# Patient Record
Sex: MALE | Race: ASIAN | NOT HISPANIC OR LATINO | Employment: UNEMPLOYED | URBAN - NONMETROPOLITAN AREA
[De-identification: names, ages, dates, MRNs, and addresses within clinical notes are randomized per-mention and may not be internally consistent; named-entity substitution may affect disease eponyms.]

---

## 2020-02-13 ENCOUNTER — APPOINTMENT (EMERGENCY)
Dept: RADIOLOGY | Facility: HOSPITAL | Age: 55
End: 2020-02-13
Payer: COMMERCIAL

## 2020-02-13 ENCOUNTER — HOSPITAL ENCOUNTER (EMERGENCY)
Facility: HOSPITAL | Age: 55
Discharge: HOME | End: 2020-02-13
Attending: EMERGENCY MEDICINE | Admitting: EMERGENCY MEDICINE
Payer: COMMERCIAL

## 2020-02-13 VITALS
WEIGHT: 315 LBS | RESPIRATION RATE: 17 BRPM | TEMPERATURE: 97.7 F | BODY MASS INDEX: 37.19 KG/M2 | HEIGHT: 77 IN | DIASTOLIC BLOOD PRESSURE: 85 MMHG | HEART RATE: 74 BPM | SYSTOLIC BLOOD PRESSURE: 153 MMHG | OXYGEN SATURATION: 99 %

## 2020-02-13 DIAGNOSIS — R07.9 CHEST PAIN: Primary | ICD-10-CM

## 2020-02-13 LAB
ALBUMIN SERPL BCP-MCNC: 3.8 G/DL (ref 3.5–5)
ALP SERPL-CCNC: 80 U/L (ref 46–116)
ALT SERPL W P-5'-P-CCNC: 21 U/L (ref 12–78)
ANION GAP SERPL CALCULATED.3IONS-SCNC: 9 MMOL/L (ref 4–13)
AST SERPL W P-5'-P-CCNC: 15 U/L (ref 5–45)
ATRIAL RATE: 79 BPM
BASOPHILS # BLD AUTO: 0.04 THOUSANDS/ΜL (ref 0–0.1)
BASOPHILS NFR BLD AUTO: 0 % (ref 0–1)
BILIRUB SERPL-MCNC: 0.33 MG/DL (ref 0.2–1)
BUN SERPL-MCNC: 13 MG/DL (ref 5–25)
CALCIUM SERPL-MCNC: 8.8 MG/DL (ref 8.3–10.1)
CHLORIDE SERPL-SCNC: 105 MMOL/L (ref 100–108)
CO2 SERPL-SCNC: 27 MMOL/L (ref 21–32)
CREAT SERPL-MCNC: 1.06 MG/DL (ref 0.6–1.3)
EOSINOPHIL # BLD AUTO: 0.11 THOUSAND/ΜL (ref 0–0.61)
EOSINOPHIL NFR BLD AUTO: 1 % (ref 0–6)
ERYTHROCYTE [DISTWIDTH] IN BLOOD BY AUTOMATED COUNT: 12.7 % (ref 11.6–15.1)
FLUAV RNA NPH QL NAA+PROBE: NORMAL
FLUBV RNA NPH QL NAA+PROBE: NORMAL
GFR SERPL CREATININE-BSD FRML MDRD: 92 ML/MIN/1.73SQ M
GLUCOSE SERPL-MCNC: 95 MG/DL (ref 65–140)
HCT VFR BLD AUTO: 44.6 % (ref 36.5–49.3)
HGB BLD-MCNC: 14.7 G/DL (ref 12–17)
IMM GRANULOCYTES # BLD AUTO: 0.03 THOUSAND/UL (ref 0–0.2)
IMM GRANULOCYTES NFR BLD AUTO: 0 % (ref 0–2)
LYMPHOCYTES # BLD AUTO: 2.82 THOUSANDS/ΜL (ref 0.6–4.47)
LYMPHOCYTES NFR BLD AUTO: 31 % (ref 14–44)
MCH RBC QN AUTO: 29.2 PG (ref 26.8–34.3)
MCHC RBC AUTO-ENTMCNC: 33 G/DL (ref 31.4–37.4)
MCV RBC AUTO: 89 FL (ref 82–98)
MONOCYTES # BLD AUTO: 0.7 THOUSAND/ΜL (ref 0.17–1.22)
MONOCYTES NFR BLD AUTO: 8 % (ref 4–12)
NEUTROPHILS # BLD AUTO: 5.27 THOUSANDS/ΜL (ref 1.85–7.62)
NEUTS SEG NFR BLD AUTO: 60 % (ref 43–75)
NRBC BLD AUTO-RTO: 0 /100 WBCS
NT-PROBNP SERPL-MCNC: 24 PG/ML
P AXIS: 47 DEGREES
PLATELET # BLD AUTO: 262 THOUSANDS/UL (ref 149–390)
PMV BLD AUTO: 10.4 FL (ref 8.9–12.7)
POTASSIUM SERPL-SCNC: 3.9 MMOL/L (ref 3.5–5.3)
PR INTERVAL: 150 MS
PROT SERPL-MCNC: 7.8 G/DL (ref 6.4–8.2)
QRS AXIS: 80 DEGREES
QRSD INTERVAL: 84 MS
QT INTERVAL: 376 MS
QTC INTERVAL: 431 MS
RBC # BLD AUTO: 5.04 MILLION/UL (ref 3.88–5.62)
RSV RNA NPH QL NAA+PROBE: NORMAL
SODIUM SERPL-SCNC: 141 MMOL/L (ref 136–145)
T WAVE AXIS: 53 DEGREES
TROPONIN I SERPL-MCNC: <0.02 NG/ML
TROPONIN I SERPL-MCNC: <0.02 NG/ML
VENTRICULAR RATE: 79 BPM
WBC # BLD AUTO: 8.97 THOUSAND/UL (ref 4.31–10.16)

## 2020-02-13 PROCEDURE — 36415 COLL VENOUS BLD VENIPUNCTURE: CPT

## 2020-02-13 PROCEDURE — 85025 COMPLETE CBC W/AUTO DIFF WBC: CPT

## 2020-02-13 PROCEDURE — 96361 HYDRATE IV INFUSION ADD-ON: CPT

## 2020-02-13 PROCEDURE — 80053 COMPREHEN METABOLIC PANEL: CPT

## 2020-02-13 PROCEDURE — 84484 ASSAY OF TROPONIN QUANT: CPT

## 2020-02-13 PROCEDURE — 83880 ASSAY OF NATRIURETIC PEPTIDE: CPT | Performed by: EMERGENCY MEDICINE

## 2020-02-13 PROCEDURE — 96374 THER/PROPH/DIAG INJ IV PUSH: CPT

## 2020-02-13 PROCEDURE — 99284 EMERGENCY DEPT VISIT MOD MDM: CPT | Performed by: EMERGENCY MEDICINE

## 2020-02-13 PROCEDURE — 87631 RESP VIRUS 3-5 TARGETS: CPT | Performed by: EMERGENCY MEDICINE

## 2020-02-13 PROCEDURE — 71046 X-RAY EXAM CHEST 2 VIEWS: CPT

## 2020-02-13 PROCEDURE — 84484 ASSAY OF TROPONIN QUANT: CPT | Performed by: EMERGENCY MEDICINE

## 2020-02-13 PROCEDURE — 93005 ELECTROCARDIOGRAM TRACING: CPT

## 2020-02-13 PROCEDURE — 96375 TX/PRO/DX INJ NEW DRUG ADDON: CPT

## 2020-02-13 PROCEDURE — 99285 EMERGENCY DEPT VISIT HI MDM: CPT

## 2020-02-13 RX ORDER — GLIMEPIRIDE 2 MG/1
2 TABLET ORAL DAILY
COMMUNITY
Start: 2019-03-14 | End: 2021-06-20

## 2020-02-13 RX ORDER — HYDROCHLOROTHIAZIDE 25 MG/1
25 TABLET ORAL DAILY
COMMUNITY
Start: 2019-10-03 | End: 2021-06-20

## 2020-02-13 RX ORDER — DEXAMETHASONE SODIUM PHOSPHATE 10 MG/ML
10 INJECTION, SOLUTION INTRAMUSCULAR; INTRAVENOUS ONCE
Status: COMPLETED | OUTPATIENT
Start: 2020-02-13 | End: 2020-02-13

## 2020-02-13 RX ORDER — KETOROLAC TROMETHAMINE 30 MG/ML
30 INJECTION, SOLUTION INTRAMUSCULAR; INTRAVENOUS ONCE
Status: COMPLETED | OUTPATIENT
Start: 2020-02-13 | End: 2020-02-13

## 2020-02-13 RX ORDER — ACETAMINOPHEN 325 MG/1
650 TABLET ORAL ONCE
Status: COMPLETED | OUTPATIENT
Start: 2020-02-13 | End: 2020-02-13

## 2020-02-13 RX ORDER — ASPIRIN 325 MG
325 TABLET ORAL ONCE
Status: COMPLETED | OUTPATIENT
Start: 2020-02-13 | End: 2020-02-13

## 2020-02-13 RX ORDER — IRBESARTAN 300 MG/1
300 TABLET ORAL DAILY
COMMUNITY
Start: 2019-09-05 | End: 2021-06-20

## 2020-02-13 RX ORDER — ONDANSETRON 2 MG/ML
4 INJECTION INTRAMUSCULAR; INTRAVENOUS ONCE
Status: COMPLETED | OUTPATIENT
Start: 2020-02-13 | End: 2020-02-13

## 2020-02-13 RX ORDER — SODIUM CHLORIDE 9 MG/ML
3 INJECTION INTRAVENOUS AS NEEDED
Status: DISCONTINUED | OUTPATIENT
Start: 2020-02-13 | End: 2020-02-13 | Stop reason: HOSPADM

## 2020-02-13 RX ADMIN — KETOROLAC TROMETHAMINE 30 MG: 30 INJECTION, SOLUTION INTRAMUSCULAR at 00:35

## 2020-02-13 RX ADMIN — FAMOTIDINE 20 MG: 10 INJECTION, SOLUTION INTRAVENOUS at 00:33

## 2020-02-13 RX ADMIN — ONDANSETRON 4 MG: 2 INJECTION INTRAMUSCULAR; INTRAVENOUS at 00:33

## 2020-02-13 RX ADMIN — SODIUM CHLORIDE 1000 ML: 0.9 INJECTION, SOLUTION INTRAVENOUS at 00:26

## 2020-02-13 RX ADMIN — DEXAMETHASONE SODIUM PHOSPHATE 10 MG: 10 INJECTION, SOLUTION INTRAMUSCULAR; INTRAVENOUS at 00:36

## 2020-02-13 RX ADMIN — ASPIRIN 325 MG ORAL TABLET 325 MG: 325 PILL ORAL at 01:51

## 2020-02-13 RX ADMIN — ACETAMINOPHEN 650 MG: 325 TABLET ORAL at 00:36

## 2020-02-13 RX ADMIN — NITROGLYCERIN 1 INCH: 20 OINTMENT TOPICAL at 01:52

## 2020-02-13 NOTE — DISCHARGE INSTRUCTIONS
Make sure to follow up with your family doctor and Cardiology tomorrow  If symptoms persist or worsen, return to ER!

## 2020-02-14 NOTE — ED PROVIDER NOTES
History  Chief Complaint   Patient presents with    Chest Pain     Patient started with abdominal pain, chest pain, headache, sore throat and nausea at 11am         Chest Pain   Pain location:  Substernal area  Pain quality: aching and dull    Pain radiates to:  Does not radiate  Pain severity:  Moderate  Onset quality:  Gradual  Duration:  4 hours  Timing:  Constant  Progression:  Worsening  Context: not breathing    Relieved by:  None tried  Worsened by:  Nothing tried  Ineffective treatments:  None tried  Associated symptoms: cough    Associated symptoms: no abdominal pain, no back pain, no lower extremity edema, no nausea, no shortness of breath and not vomiting        Prior to Admission Medications   Prescriptions Last Dose Informant Patient Reported? Taking?   glimepiride (AMARYL) 2 mg tablet   Yes Yes   Sig: Take 2 mg by mouth daily   hydrochlorothiazide (HYDRODIURIL) 25 mg tablet   Yes Yes   Sig: Take 25 mg by mouth daily   irbesartan (AVAPRO) 300 mg tablet   Yes Yes   Sig: Take 300 mg by mouth daily      Facility-Administered Medications: None       Past Medical History:   Diagnosis Date    Stroke [facility])        History reviewed  No pertinent surgical history  History reviewed  No pertinent family history  I have reviewed and agree with the history as documented  Social History     Tobacco Use    Smoking status: Current Every Day Smoker     Packs/day: 0 25     Types: Cigarettes    Smokeless tobacco: Never Used   Substance Use Topics    Alcohol use: Not Currently    Drug use: Never       Review of Systems   HENT: Positive for sore throat  Respiratory: Positive for cough  Negative for shortness of breath  Cardiovascular: Positive for chest pain  Gastrointestinal: Negative for abdominal pain, nausea and vomiting  Musculoskeletal: Negative for back pain  All other systems reviewed and are negative        Physical Exam  Physical Exam   Constitutional: He is oriented to person, place, and time  He appears well-developed and well-nourished  HENT:   Head: Normocephalic and atraumatic  Eyes: Pupils are equal, round, and reactive to light  EOM are normal    Neck: Normal range of motion  Neck supple  Cardiovascular: Normal rate, regular rhythm, intact distal pulses and normal pulses  Pulmonary/Chest: Effort normal and breath sounds normal  No accessory muscle usage  No respiratory distress  Abdominal: Soft  Bowel sounds are normal    Musculoskeletal: Normal range of motion  Right lower leg: Normal         Left lower leg: Normal    Neurological: He is alert and oriented to person, place, and time  Skin: Skin is warm and dry  Capillary refill takes less than 2 seconds  Psychiatric: He has a normal mood and affect  Nursing note and vitals reviewed        Vital Signs  ED Triage Vitals   Temperature Pulse Respirations Blood Pressure SpO2   02/13/20 0007 02/13/20 0030 02/13/20 0007 02/13/20 0010 02/13/20 0030   97 7 °F (36 5 °C) 75 18 168/99 97 %      Temp Source Heart Rate Source Patient Position - Orthostatic VS BP Location FiO2 (%)   02/13/20 0007 02/13/20 0007 02/13/20 0007 02/13/20 0007 --   Temporal Monitor Lying Left arm       Pain Score       02/13/20 0007       Worst Possible Pain           Vitals:    02/13/20 0030 02/13/20 0100 02/13/20 0200 02/13/20 0330   BP: 161/94 156/82 146/92 153/85   Pulse: 75 84 69 74   Patient Position - Orthostatic VS: Lying Lying Lying Lying         Visual Acuity      ED Medications  Medications   sodium chloride 0 9 % bolus 1,000 mL (0 mL Intravenous Stopped 2/13/20 0230)   dexamethasone (PF) (DECADRON) injection 10 mg (10 mg Intravenous Given 2/13/20 0036)   ketorolac (TORADOL) injection 30 mg (30 mg Intravenous Given 2/13/20 0035)   ondansetron (ZOFRAN) injection 4 mg (4 mg Intravenous Given 2/13/20 0033)   famotidine (PEPCID) injection 20 mg (20 mg Intravenous Given 2/13/20 0033)   acetaminophen (TYLENOL) tablet 650 mg (650 mg Oral Given 2/13/20 0036)   aspirin tablet 325 mg (325 mg Oral Given 2/13/20 0151)   nitroglycerin (NITRO-BID) 2 % TD ointment 1 inch (1 inch Topical Given 2/13/20 0152)       Diagnostic Studies  Results Reviewed     Procedure Component Value Units Date/Time    Troponin I [[id number]]  (Normal) Collected:  02/13/20 0327    Lab Status:  Final result Specimen:  Blood from Arm, Left Updated:  02/13/20 0353     Troponin I <0 02 ng/mL     Influenza A/B and RSV PCR [[id number]]  (Normal) Collected:  02/13/20 0026    Lab Status:  Final result Specimen:  Nasopharyngeal Swab Updated:  02/13/20 0114     INFLUENZA A PCR None Detected     INFLUENZA B PCR None Detected     RSV PCR None Detected    Comprehensive metabolic panel [[id number]] Collected:  02/13/20 0026    Lab Status:  Final result Specimen:  Blood from Arm, Right Updated:  02/13/20 0057     Sodium 141 mmol/L      Potassium 3 9 mmol/L      Chloride 105 mmol/L      CO2 27 mmol/L      ANION GAP 9 mmol/L      BUN 13 mg/dL      Creatinine 1 06 mg/dL      Glucose 95 mg/dL      Calcium 8 8 mg/dL      AST 15 U/L      ALT 21 U/L      Alkaline Phosphatase 80 U/L      Total Protein 7 8 g/dL      Albumin 3 8 g/dL      Total Bilirubin 0 33 mg/dL      eGFR 92 ml/min/1 73sq m     Narrative:       Regine guidelines for Chronic Kidney Disease (CKD):     Stage 1 with normal or high GFR (GFR > 90 mL/min/1 73 square meters)    Stage 2 Mild CKD (GFR = 60-89 mL/min/1 73 square meters)    Stage 3A Moderate CKD (GFR = 45-59 mL/min/1 73 square meters)    Stage 3B Moderate CKD (GFR = 30-44 mL/min/1 73 square meters)    Stage 4 Severe CKD (GFR = 15-29 mL/min/1 73 square meters)    Stage 5 End Stage CKD (GFR <15 mL/min/1 73 square meters)  Note: GFR calculation is accurate only with a steady state creatinine    NT-BNP PRO [[id number]]  (Normal) Collected:  02/13/20 0026    Lab Status:  Final result Specimen:  Blood from Arm, Right Updated:  02/13/20 0057     NT-proBNP 24 pg/mL Troponin I [[id number]]  (Normal) Collected:  02/13/20 0026    Lab Status:  Final result Specimen:  Blood from Arm, Right Updated:  02/13/20 0052     Troponin I <0 02 ng/mL     CBC and differential [[id number]] Collected:  02/13/20 0026    Lab Status:  Final result Specimen:  Blood from Arm, Right Updated:  02/13/20 0035     WBC 8 97 Thousand/uL      RBC 5 04 Million/uL      Hemoglobin 14 7 g/dL      Hematocrit 44 6 %      MCV 89 fL      MCH 29 2 pg      MCHC 33 0 g/dL      RDW 12 7 %      MPV 10 4 fL      Platelets 580 Thousands/uL      nRBC 0 /100 WBCs      Neutrophils Relative 60 %      Immat GRANS % 0 %      Lymphocytes Relative 31 %      Monocytes Relative 8 %      Eosinophils Relative 1 %      Basophils Relative 0 %      Neutrophils Absolute 5 27 Thousands/µL      Immature Grans Absolute 0 03 Thousand/uL      Lymphocytes Absolute 2 82 Thousands/µL      Monocytes Absolute 0 70 Thousand/µL      Eosinophils Absolute 0 11 Thousand/µL      Basophils Absolute 0 04 Thousands/µL                  XR chest 2 views   Final Result by Dennis Phan MD (02/13 0941)      Mildly tortuous ascending aorta  This could be related to patient's hypertension and should be correlated clinically  The lungs are clear         Comparison to prior x-ray would be useful if it becomes available  Alternatively CT or echocardiography may be useful to assess the aortic size  The study was marked in EPIC for significant notification  Workstation performed: [id number]                    Procedures  Procedures         ED Course  ED Course as of Feb 14 0024   Thu Feb 13, 2020   0127 Initial troponin was negative, will repeat troponin  Troponin I: <0 02   0404 Repeat troponin negative  Patient is afebrile, vital signs stable, nontoxic appearing, counseled patient extensively signs and symptoms return and follow-up family doctor              HEART Risk Score      Most Recent Value   History  0 Filed at: 02/13/2020 0128   ECG 0 Filed at: 02/13/2020 0128   Age  1 Filed at: 02/13/2020 0128   Risk Factors  2 Filed at: 02/13/2020 0128   Troponin  0 Filed at: 02/13/2020 0128   Heart Score Risk Calculator   History  0 Filed at: 02/13/2020 0128   ECG  0 Filed at: 02/13/2020 0128   Age  1 Filed at: 02/13/2020 0128   Risk Factors  2 Filed at: 02/13/2020 0128   Troponin  0 Filed at: 02/13/2020 0128   HEART Score  3 Filed at: 02/13/2020 0128   HEART Score  3 Filed at: 02/13/2020 0128                            MDM      Disposition  Final diagnoses:   Chest pain     Time reflects when diagnosis was documented in both MDM as applicable and the Disposition within this note     Time User Action Codes Description Comment    2/13/2020  4:07 AM Clinton Hollins Add [R07 9] Chest pain       ED Disposition     ED Disposition Condition Date/Time Comment    Discharge Stable Thu Feb 13, 2020  4:07 AM Elena Shea discharge to home/self care  Follow-up Information     Follow up With Specialties Details Why Contact Info Additional Information    [facility] Family Medicine Call   [place] [phone]  [address], [place], [place] Cardiology Call today  [phone]  [phone]             Discharge Medication List as of 2/13/2020  4:09 AM      CONTINUE these medications which have NOT CHANGED    Details   glimepiride (AMARYL) 2 mg tablet Take 2 mg by mouth daily, Starting Thu 3/14/2019, Until Fri 3/13/2020, Historical Med      hydrochlorothiazide (HYDRODIURIL) 25 mg tablet Take 25 mg by mouth daily, Starting Thu 10/3/2019, Until Fri 10/2/2020, Historical Med      irbesartan (AVAPRO) 300 mg tablet Take 300 mg by mouth daily, Starting Thu 9/5/2019, Until Fri 9/4/2020, Historical Med           No discharge procedures on file      PDMP Review     None          ED Provider  Electronically Signed by           Adal Dean DO  02/14/20 6950

## 2020-08-20 ENCOUNTER — TRANSCRIBE ORDERS (OUTPATIENT)
Dept: ADMINISTRATIVE | Facility: HOSPITAL | Age: 55
End: 2020-08-20

## 2020-08-20 DIAGNOSIS — Z01.810 PREOP CARDIOVASCULAR EXAM: Primary | ICD-10-CM

## 2020-11-23 ENCOUNTER — APPOINTMENT (EMERGENCY)
Dept: NON INVASIVE DIAGNOSTICS | Facility: HOSPITAL | Age: 55
End: 2020-11-23
Payer: COMMERCIAL

## 2020-11-23 ENCOUNTER — APPOINTMENT (EMERGENCY)
Dept: CT IMAGING | Facility: HOSPITAL | Age: 55
End: 2020-11-23
Payer: COMMERCIAL

## 2020-11-23 ENCOUNTER — HOSPITAL ENCOUNTER (EMERGENCY)
Facility: HOSPITAL | Age: 55
Discharge: HOME | End: 2020-11-23
Attending: EMERGENCY MEDICINE
Payer: COMMERCIAL

## 2020-11-23 ENCOUNTER — APPOINTMENT (EMERGENCY)
Dept: RADIOLOGY | Facility: HOSPITAL | Age: 55
End: 2020-11-23
Payer: COMMERCIAL

## 2020-11-23 VITALS
HEART RATE: 82 BPM | SYSTOLIC BLOOD PRESSURE: 155 MMHG | WEIGHT: 315 LBS | HEIGHT: 77 IN | TEMPERATURE: 98.2 F | DIASTOLIC BLOOD PRESSURE: 87 MMHG | RESPIRATION RATE: 16 BRPM | BODY MASS INDEX: 37.19 KG/M2 | OXYGEN SATURATION: 100 %

## 2020-11-23 DIAGNOSIS — M25.552 LEFT HIP PAIN: Primary | ICD-10-CM

## 2020-11-23 DIAGNOSIS — M79.605 LEFT LEG PAIN: ICD-10-CM

## 2020-11-23 LAB
ANION GAP SERPL CALCULATED.3IONS-SCNC: 7 MMOL/L (ref 4–13)
APTT PPP: 37 SECONDS (ref 23–37)
BASOPHILS # BLD AUTO: 0.02 THOUSANDS/ΜL (ref 0–0.1)
BASOPHILS NFR BLD AUTO: 0 % (ref 0–1)
BUN SERPL-MCNC: 13 MG/DL (ref 5–25)
CALCIUM SERPL-MCNC: 8.9 MG/DL (ref 8.3–10.1)
CHLORIDE SERPL-SCNC: 106 MMOL/L (ref 100–108)
CO2 SERPL-SCNC: 28 MMOL/L (ref 21–32)
CREAT SERPL-MCNC: 0.99 MG/DL (ref 0.6–1.3)
EOSINOPHIL # BLD AUTO: 0.12 THOUSAND/ΜL (ref 0–0.61)
EOSINOPHIL NFR BLD AUTO: 1 % (ref 0–6)
ERYTHROCYTE [DISTWIDTH] IN BLOOD BY AUTOMATED COUNT: 12.7 % (ref 11.6–15.1)
GFR SERPL CREATININE-BSD FRML MDRD: 99 ML/MIN/1.73SQ M
GLUCOSE SERPL-MCNC: 104 MG/DL (ref 65–140)
HCT VFR BLD AUTO: 42.1 % (ref 36.5–49.3)
HGB BLD-MCNC: 13.5 G/DL (ref 12–17)
IMM GRANULOCYTES # BLD AUTO: 0.02 THOUSAND/UL (ref 0–0.2)
IMM GRANULOCYTES NFR BLD AUTO: 0 % (ref 0–2)
INR PPP: 1.09 (ref 0.84–1.19)
LYMPHOCYTES # BLD AUTO: 2.48 THOUSANDS/ΜL (ref 0.6–4.47)
LYMPHOCYTES NFR BLD AUTO: 30 % (ref 14–44)
MCH RBC QN AUTO: 28.1 PG (ref 26.8–34.3)
MCHC RBC AUTO-ENTMCNC: 32.1 G/DL (ref 31.4–37.4)
MCV RBC AUTO: 88 FL (ref 82–98)
MONOCYTES # BLD AUTO: 0.69 THOUSAND/ΜL (ref 0.17–1.22)
MONOCYTES NFR BLD AUTO: 8 % (ref 4–12)
NEUTROPHILS # BLD AUTO: 5.05 THOUSANDS/ΜL (ref 1.85–7.62)
NEUTS SEG NFR BLD AUTO: 61 % (ref 43–75)
NRBC BLD AUTO-RTO: 0 /100 WBCS
PLATELET # BLD AUTO: 260 THOUSANDS/UL (ref 149–390)
PMV BLD AUTO: 9.4 FL (ref 8.9–12.7)
POTASSIUM SERPL-SCNC: 4 MMOL/L (ref 3.5–5.3)
PROTHROMBIN TIME: 13.9 SECONDS (ref 11.6–14.5)
RBC # BLD AUTO: 4.81 MILLION/UL (ref 3.88–5.62)
SODIUM SERPL-SCNC: 141 MMOL/L (ref 136–145)
WBC # BLD AUTO: 8.38 THOUSAND/UL (ref 4.31–10.16)

## 2020-11-23 PROCEDURE — 96375 TX/PRO/DX INJ NEW DRUG ADDON: CPT

## 2020-11-23 PROCEDURE — 73552 X-RAY EXAM OF FEMUR 2/>: CPT

## 2020-11-23 PROCEDURE — 73701 CT LOWER EXTREMITY W/DYE: CPT

## 2020-11-23 PROCEDURE — 80048 BASIC METABOLIC PNL TOTAL CA: CPT | Performed by: EMERGENCY MEDICINE

## 2020-11-23 PROCEDURE — 85610 PROTHROMBIN TIME: CPT | Performed by: EMERGENCY MEDICINE

## 2020-11-23 PROCEDURE — 96361 HYDRATE IV INFUSION ADD-ON: CPT

## 2020-11-23 PROCEDURE — 85025 COMPLETE CBC W/AUTO DIFF WBC: CPT | Performed by: EMERGENCY MEDICINE

## 2020-11-23 PROCEDURE — 96374 THER/PROPH/DIAG INJ IV PUSH: CPT

## 2020-11-23 PROCEDURE — 99284 EMERGENCY DEPT VISIT MOD MDM: CPT

## 2020-11-23 PROCEDURE — 93971 EXTREMITY STUDY: CPT | Performed by: SURGERY

## 2020-11-23 PROCEDURE — 85730 THROMBOPLASTIN TIME PARTIAL: CPT | Performed by: EMERGENCY MEDICINE

## 2020-11-23 PROCEDURE — 36415 COLL VENOUS BLD VENIPUNCTURE: CPT | Performed by: EMERGENCY MEDICINE

## 2020-11-23 PROCEDURE — 73502 X-RAY EXAM HIP UNI 2-3 VIEWS: CPT

## 2020-11-23 PROCEDURE — 93971 EXTREMITY STUDY: CPT

## 2020-11-23 PROCEDURE — 99285 EMERGENCY DEPT VISIT HI MDM: CPT | Performed by: EMERGENCY MEDICINE

## 2020-11-23 RX ORDER — KETOROLAC TROMETHAMINE 30 MG/ML
15 INJECTION, SOLUTION INTRAMUSCULAR; INTRAVENOUS ONCE
Status: COMPLETED | OUTPATIENT
Start: 2020-11-23 | End: 2020-11-23

## 2020-11-23 RX ORDER — TRAMADOL HYDROCHLORIDE 50 MG/1
50 TABLET ORAL EVERY 6 HOURS PRN
Qty: 10 TABLET | Refills: 0 | Status: SHIPPED | OUTPATIENT
Start: 2020-11-23 | End: 2021-06-20 | Stop reason: ALTCHOICE

## 2020-11-23 RX ORDER — MORPHINE SULFATE 4 MG/ML
4 INJECTION, SOLUTION INTRAMUSCULAR; INTRAVENOUS ONCE
Status: COMPLETED | OUTPATIENT
Start: 2020-11-23 | End: 2020-11-23

## 2020-11-23 RX ADMIN — SODIUM CHLORIDE 1000 ML: 0.9 INJECTION, SOLUTION INTRAVENOUS at 15:25

## 2020-11-23 RX ADMIN — IOHEXOL 100 ML: 350 INJECTION, SOLUTION INTRAVENOUS at 15:44

## 2020-11-23 RX ADMIN — KETOROLAC TROMETHAMINE 15 MG: 30 INJECTION, SOLUTION INTRAMUSCULAR at 15:24

## 2020-11-23 RX ADMIN — MORPHINE SULFATE 4 MG: 4 INJECTION INTRAVENOUS at 15:24

## 2021-06-20 ENCOUNTER — APPOINTMENT (EMERGENCY)
Dept: RADIOLOGY | Facility: HOSPITAL | Age: 56
End: 2021-06-20
Payer: COMMERCIAL

## 2021-06-20 ENCOUNTER — HOSPITAL ENCOUNTER (EMERGENCY)
Facility: HOSPITAL | Age: 56
Discharge: HOME | End: 2021-06-20
Attending: EMERGENCY MEDICINE | Admitting: EMERGENCY MEDICINE
Payer: COMMERCIAL

## 2021-06-20 VITALS
HEIGHT: 77 IN | WEIGHT: 315 LBS | BODY MASS INDEX: 37.19 KG/M2 | OXYGEN SATURATION: 98 % | RESPIRATION RATE: 16 BRPM | TEMPERATURE: 97.4 F | SYSTOLIC BLOOD PRESSURE: 172 MMHG | HEART RATE: 82 BPM | DIASTOLIC BLOOD PRESSURE: 86 MMHG

## 2021-06-20 DIAGNOSIS — M25.552 LEFT HIP PAIN: Primary | ICD-10-CM

## 2021-06-20 PROCEDURE — 96372 THER/PROPH/DIAG INJ SC/IM: CPT

## 2021-06-20 PROCEDURE — 99283 EMERGENCY DEPT VISIT LOW MDM: CPT

## 2021-06-20 PROCEDURE — 73552 X-RAY EXAM OF FEMUR 2/>: CPT

## 2021-06-20 PROCEDURE — 99284 EMERGENCY DEPT VISIT MOD MDM: CPT | Performed by: PHYSICIAN ASSISTANT

## 2021-06-20 PROCEDURE — 73502 X-RAY EXAM HIP UNI 2-3 VIEWS: CPT

## 2021-06-20 RX ORDER — LIDOCAINE 50 MG/G
1 PATCH TOPICAL DAILY
Qty: 6 PATCH | Refills: 0 | Status: SHIPPED | OUTPATIENT
Start: 2021-06-20 | End: 2021-07-07

## 2021-06-20 RX ORDER — KETOROLAC TROMETHAMINE 30 MG/ML
15 INJECTION, SOLUTION INTRAMUSCULAR; INTRAVENOUS ONCE
Status: COMPLETED | OUTPATIENT
Start: 2021-06-20 | End: 2021-06-20

## 2021-06-20 RX ORDER — LIDOCAINE 50 MG/G
1 PATCH TOPICAL ONCE
Status: DISCONTINUED | OUTPATIENT
Start: 2021-06-20 | End: 2021-06-20 | Stop reason: HOSPADM

## 2021-06-20 RX ORDER — NAPROXEN 500 MG/1
500 TABLET ORAL 2 TIMES DAILY WITH MEALS
Qty: 10 TABLET | Refills: 0 | Status: SHIPPED | OUTPATIENT
Start: 2021-06-20 | End: 2021-07-07

## 2021-06-20 RX ADMIN — LIDOCAINE 1 PATCH: 50 PATCH TOPICAL at 16:39

## 2021-06-20 RX ADMIN — KETOROLAC TROMETHAMINE 15 MG: 30 INJECTION, SOLUTION INTRAMUSCULAR at 16:39

## 2021-06-20 NOTE — DISCHARGE INSTRUCTIONS
Please follow up with your orthopedic surgeon  Additionally we would like you to follow-up with the pain management specialist, referral has been placed for you    If you have any new or worsening symptoms please return immediately to the emergency department

## 2021-06-20 NOTE — ED PROVIDER NOTES
History  Chief Complaint   Patient presents with    Hip Pain     pt had left hip replacement 8/2020 and c/o worsening left hip pain since  pt has seen surgeon and told to loose weight which he has w/o relief  pt taking tylenol w/o relief  denies trauma/injury/fevers/cough/sob     63-year-old male presents emergency department for evaluation of left hip pain  Patient states he had hip replaced on August 2020  Reports he has continued with pain left hip since  States he did return to his surgeon in December where he had x-rays which he reports were unremarkable  States surgeon recommended he lose weight and reports he did lose some weight but has noted no improvement of symptoms  Has not return to see surgeon  Denies any weakness, numbness, paresthesias  Denies any fevers or chills  Denies any swelling  Patient states he did take Tylenol without improvement of symptoms  He denies any recent trauma, fall, injury  History provided by:  Patient  Hip Pain  Location:  Left hip  Quality:  Sharp  Severity:  Mild  Onset quality:  Gradual  Timing:  Constant  Progression:  Unchanged  Chronicity:  Chronic  Relieved by:  Rest  Worsened by:  Bearing weight  Ineffective treatments:  Tylenol  Associated symptoms: no abdominal pain, no chest pain, no congestion, no cough, no diarrhea, no ear pain, no fatigue, no fever, no headaches, no loss of consciousness, no myalgias, no nausea, no rash, no rhinorrhea, no shortness of breath, no sore throat, no vomiting and no wheezing        None       Past Medical History:   Diagnosis Date    Stroke [facility])        Past Surgical History:   Procedure Laterality Date    JOINT REPLACEMENT Left     hip       History reviewed  No pertinent family history  I have reviewed and agree with the history as documented      E-Cigarette/Vaping    E-Cigarette Use Never User      E-Cigarette/Vaping Substances     Social History     Tobacco Use    Smoking status: Current Every Day Smoker Packs/day: 0 25     Types: Cigarettes    Smokeless tobacco: Never Used   Vaping Use    Vaping Use: Never used   Substance Use Topics    Alcohol use: Not Currently    Drug use: Never       Review of Systems   Constitutional: Negative  Negative for fatigue and fever  HENT: Negative  Negative for congestion, ear pain, rhinorrhea and sore throat  Respiratory: Negative  Negative for cough, shortness of breath and wheezing  Cardiovascular: Negative  Negative for chest pain  Gastrointestinal: Negative  Negative for abdominal pain, diarrhea, nausea and vomiting  Genitourinary: Negative  Musculoskeletal: Positive for arthralgias  Negative for joint swelling and myalgias  Skin: Negative  Negative for rash  Neurological: Negative  Negative for loss of consciousness and headaches  All other systems reviewed and are negative  Physical Exam  Physical Exam  Vitals and nursing note reviewed  Constitutional:       General: He is not in acute distress  Appearance: Normal appearance  He is normal weight  He is not ill-appearing, toxic-appearing or diaphoretic  HENT:      Head: Normocephalic and atraumatic  Nose: Nose normal       Mouth/Throat:      Mouth: Mucous membranes are moist       Pharynx: Oropharynx is clear  No oropharyngeal exudate or posterior oropharyngeal erythema  Eyes:      Extraocular Movements: Extraocular movements intact  Conjunctiva/sclera: Conjunctivae normal       Pupils: Pupils are equal, round, and reactive to light  Pulmonary:      Effort: Pulmonary effort is normal    Musculoskeletal:         General: No swelling, deformity or signs of injury  Normal range of motion  Cervical back: Normal range of motion  Right hip: Normal       Left hip: Tenderness and bony tenderness present  No deformity, lacerations or crepitus  Normal range of motion  Normal strength  Right lower leg: No edema  Left lower leg: No swelling  No edema  Legs:       Comments: No skin changes over the left hip, no swelling, redness, warmth  No area of fluctuance  No bruising  Skin:     General: Skin is warm and dry  Capillary Refill: Capillary refill takes less than 2 seconds  Findings: No bruising, erythema or rash  Neurological:      General: No focal deficit present  Mental Status: He is alert and oriented to person, place, and time  Psychiatric:         Mood and Affect: Mood normal          Behavior: Behavior normal          Vital Signs  ED Triage Vitals [06/20/21 1522]   Temperature Pulse Respirations Blood Pressure SpO2   98 8 °F (37 1 °C) 89 18 (!) 205/103 96 %      Temp Source Heart Rate Source Patient Position - Orthostatic VS BP Location FiO2 (%)   Temporal Monitor Sitting Right arm --      Pain Score       Worst Possible Pain           Vitals:    06/20/21 1522 06/20/21 1643   BP: (!) 205/103 (!) 172/86   Pulse: 89 82   Patient Position - Orthostatic VS: Sitting          Visual Acuity      ED Medications  Medications   lidocaine (LIDODERM) 5 % patch 1 patch (1 patch Topical Medication Applied 6/20/21 1639)   ketorolac (TORADOL) injection 15 mg (15 mg Intramuscular Given 6/20/21 1639)       Diagnostic Studies  Results Reviewed     None                 XR femur 2 views LEFT   ED Interpretation by Ramon Dupree PA-C (06/20 1622)   No acute osseous injury      XR hip/pelv 2-3 vws left if performed   ED Interpretation by Ramon Dupree PA-C (06/20 1622)   No acute osseous injury                 Procedures  Procedures         ED Course  ED Course as of Jun 20 1835   Sun Jun 20, 2021   1622 ED interpretation of x-rays negative for acute osseous injury  1624 I discussed results and findings with the patient  We discussed symptomatic treatment at home and symptoms that require prompt return to the ED for further evaluation patient verbalized understanding    Patient was encouraged to follow up with his surgeon additionally will place referral for pain management  MDM  Number of Diagnoses or Management Options  Left hip pain: new and requires workup  Diagnosis management comments: 54year old male presents emergency department for evaluation of chronic left hip pain  Patient is status post hip replacement August 2020  Vitals in medical record reviewed  Patient denies any fevers or chills  There is no sign of infection on exam   No area of fluctuance concerning for abscess  Surgical scar is well healed  Patient has normal range of motion of the hip  ED interpretation of x-rays negative for acute osseous injury  Hardware in place  I discussed results and findings with the patient  Pain treated in the emergency department  Recommended patient follow-up with surgeon as well as pain management  Referral was placed  Patient was educated on symptoms that require prompt return to the emergency department for further evaluation and verbalized understanding  Amount and/or Complexity of Data Reviewed  Tests in the radiology section of CPT®: ordered and reviewed  Review and summarize past medical records: yes  Independent visualization of images, tracings, or specimens: yes        Disposition  Final diagnoses:   Left hip pain     Time reflects when diagnosis was documented in both MDM as applicable and the Disposition within this note     Time User Action Codes Description Comment    6/20/2021  4:26 PM Eli Marte Add [F02 045] Left hip pain       ED Disposition     ED Disposition Condition Date/Time Comment    Discharge Stable Sun Jun 20, 2021  4:26 PM Diony Grief discharge to home/self care  Follow-up Information     Follow up With Specialties Details Why [address],  Emergency Medicine, Family Medicine In 1 week  [address]   [address]  [address]  [phone]            Discharge Medication List as of 6/20/2021  4:28 PM      START taking these medications Details   lidocaine (LIDODERM) 5 % Apply 1 patch topically daily Remove & Discard patch within 12 hours or as directed by MD, Starting Sun 6/20/2021, Normal      naproxen (NAPROSYN) 500 mg tablet Take 1 tablet (500 mg total) by mouth 2 (two) times a day with meals for 5 days, Starting Sun 6/20/2021, Until Fri 6/25/2021, Normal               PDMP Review     None          ED Provider  Electronically Signed by           Edmond Valdez PA-C  06/20/21 1942

## 2021-07-06 RX ORDER — ATORVASTATIN CALCIUM 20 MG/1
TABLET, FILM COATED ORAL
COMMUNITY
End: 2021-07-07

## 2021-07-06 RX ORDER — DICLOFENAC SODIUM 75 MG/1
75 TABLET, DELAYED RELEASE ORAL
COMMUNITY
End: 2021-07-07

## 2021-07-06 RX ORDER — IBUPROFEN 800 MG/1
800 TABLET ORAL EVERY 6 HOURS PRN
COMMUNITY
Start: 2020-10-25 | End: 2021-07-07

## 2021-07-06 RX ORDER — IRBESARTAN 150 MG/1
TABLET ORAL
COMMUNITY
End: 2021-07-07

## 2021-07-06 RX ORDER — PROPRANOLOL HYDROCHLORIDE 20 MG/5ML
SOLUTION ORAL
COMMUNITY
End: 2021-07-07

## 2021-07-06 RX ORDER — GLIMEPIRIDE 2 MG/1
TABLET ORAL
COMMUNITY
End: 2021-07-07

## 2021-07-07 ENCOUNTER — CONSULT (OUTPATIENT)
Dept: PAIN MEDICINE | Facility: CLINIC | Age: 56
End: 2021-07-07
Payer: COMMERCIAL

## 2021-07-07 ENCOUNTER — TELEPHONE (OUTPATIENT)
Dept: PAIN MEDICINE | Facility: MEDICAL CENTER | Age: 56
End: 2021-07-07

## 2021-07-07 VITALS
BODY MASS INDEX: 39.17 KG/M2 | HEIGHT: 75 IN | DIASTOLIC BLOOD PRESSURE: 88 MMHG | WEIGHT: 315 LBS | SYSTOLIC BLOOD PRESSURE: 146 MMHG | TEMPERATURE: 98 F | RESPIRATION RATE: 20 BRPM | HEART RATE: 72 BPM

## 2021-07-07 DIAGNOSIS — Z96.642 HISTORY OF TOTAL LEFT HIP REPLACEMENT: Primary | ICD-10-CM

## 2021-07-07 DIAGNOSIS — G89.29 CHRONIC HIP PAIN AFTER TOTAL REPLACEMENT OF LEFT HIP JOINT: Primary | ICD-10-CM

## 2021-07-07 DIAGNOSIS — M25.552 LEFT HIP PAIN: ICD-10-CM

## 2021-07-07 DIAGNOSIS — M25.552 CHRONIC HIP PAIN AFTER TOTAL REPLACEMENT OF LEFT HIP JOINT: Primary | ICD-10-CM

## 2021-07-07 DIAGNOSIS — Z96.642 CHRONIC HIP PAIN AFTER TOTAL REPLACEMENT OF LEFT HIP JOINT: Primary | ICD-10-CM

## 2021-07-07 PROCEDURE — 99204 OFFICE O/P NEW MOD 45 MIN: CPT | Performed by: ANESTHESIOLOGY

## 2021-07-07 RX ORDER — MELOXICAM 7.5 MG/1
7.5 TABLET ORAL DAILY
Qty: 60 TABLET | Refills: 0 | Status: SHIPPED | OUTPATIENT
Start: 2021-07-07 | End: 2021-07-12 | Stop reason: SDUPTHER

## 2021-07-07 NOTE — PATIENT INSTRUCTIONS
Hip Bursitis Exercises   AMBULATORY CARE:   Hip bursitis exercises  help strengthen the muscles in your hip and keep the joint flexible  Strong muscles can help reduce pain, prevent injury, and keep the joint stable  The exercises can also help increase the range of motion in your hip joint  What you need to know about exercise safety:   · Move slowly and smoothly  Avoid fast or jerky motions  This will help prevent an injury  · Breathe normally  Do not hold your breath  It is important to breathe in and out so you do not tense up during exercise  Tension could prevent you from moving your joint in a full range of motion  · Do the exercises and stretches on both legs  Do this so both hips remain strong and flexible  · Stop if you feel sharp pain or an increase in pain  Contact your healthcare provider or physical therapist  It is normal to feel some discomfort during exercise  Regular exercise will help decrease your discomfort over time  · Warm up before you stretch and exercise  Walk or ride a stationary bike for 5 to 10 minutes  How to do hip stretches: Your healthcare provider or physical therapist will tell you how many times to do each stretch  Do the stretch on both sides before you move to the next stretch  · Standing iliotibial band stretch:  Stand with the leg on your injured side behind your other leg  Bend sideways toward the side that is not injured  Stop when you feel a stretch in your outer hip  Hold for 5 to 10 seconds  Then return to the starting position  · Lying iliotibial band stretch:  Lie on your back  Bend the knee on your injured side toward your chest  Place your hands on the outside of your knee and thigh  Slowly pull the knee across your body  Stop when you feel a stretch in your hip and outer thigh  Hold for 5 to 10 seconds  Return your leg to the starting position  · Hip stretch:  Lie on your back with both legs straight and on the [facility] the knee on your injured side toward your chest until you can reach your lower leg  Place both hands on your shin and pull your knee toward your chest  Hold for 5 to 10 seconds  Return your leg to the starting position  · Knee to chest:  Lie on your back with both knees bent and feet flat on the floor  Bend the knee on your injured side toward your chest until you can reach your lower leg  Place both hands on your shin and pull your knee toward your chest  Hold for 5 to 10 seconds  Return your leg to the starting position  · Internal hip rotator stretch:  You will do this exercise on a table  Lie on your side with the injured hip on top  You may be told to keep a pillow between your thighs  Move the top leg so the foot hangs below the edge of the table  Rotate your hip to raise your foot in the opposite direction of the bottom shoulder  Raise your foot as high as you can so you feel a stretch in the back of your thigh  Hold for 5 seconds  Then slowly lower your foot to the starting position  · External hip rotator stretch:  You will do this exercise on a table  Lie on your side with the injured hip on the bottom  You do not need a pillow between your thighs for this exercise  Move the bottom leg so the foot is off the edge of the table  Rotate your hip to lift the foot in the opposite direction of the bottom shoulder  Raise your foot as high as you can so you feel a stretch in your buttock  Hold for 5 seconds  Then slowly lower your foot to the starting position  · Kneeling hip flexor stretch:  Kneel on your knee on the injured side  Place the foot of your other leg on the floor so the knee is bent  Put both hands on top of your thigh  Keep your back straight and abdominal muscles tight  Lean forward until you feel a stretch in your other thigh  Hold the stretch for 10 seconds  Return to the starting position  How to do hip strengthening exercises:   Your healthcare provider or physical therapist will tell you how many times to do each exercise  Do the exercise on both sides before you move to the next exercise  · Straight leg lift to the side: This may also be called hip abduction  Lie on your side with straight legs, with the injured hip on top  Slowly raise your top leg toward the ceiling as high as you can  Keep your foot pointed  Hold for 5 seconds  Then slowly lower your leg to the starting position  · Inner thigh lift: This may also be called hip adduction  Lie on your side with straight legs, with the injured hip on the bottom  Cross your top leg over your bottom leg  Put the foot of your top leg on the floor in front of you  Raise your bottom leg until it touches the top leg  Hold for 5 seconds  Then slowly lower the leg to the floor  · Clam exercise:  Lie on your side so your injured side is on top  Bend your knees  Keep your heels together during this exercise  Slowly raise your top knee toward the ceiling  Then lower your leg so your knees are together  Call your doctor if:   · You have sharp pain during exercise or at rest     · You have questions or concerns about the stretches or exercises  © Copyright [address] Information is for End User's use only and may not be sold, redistributed or otherwise used for commercial purposes  All illustrations and images included in CareNotes® are the copyrighted property of A D A M , Inc  or [facility] Hakeem Holcomb   The above information is an  only  It is not intended as medical advice for individual conditions or treatments  Talk to your doctor, nurse or pharmacist before following any medical regimen to see if it is safe and effective for you

## 2021-07-07 NOTE — PROGRESS NOTES
Assessment  1  History of total left hip replacement  -     MRI arthrogram left hip; Future; Expected date: 07/07/2021  -     meloxicam (MOBIC) 7 5 mg tablet; Take 1 tablet (7 5 mg total) by mouth daily  -     Ambulatory referral to Sports Medicine; Future    2  Left hip pain  -     Ambulatory referral to Pain Management  -     Ambulatory referral to Physical Therapy; Future  -     MRI arthrogram left hip; Future; Expected date: 07/07/2021  -     meloxicam (MOBIC) 7 5 mg tablet; Take 1 tablet (7 5 mg total) by mouth daily  -     Ambulatory referral to Sports Medicine; Future    Pain with internal greater than external rotation of left hip  Pain limited weakness with hip flexion knee extension secondary to debilitating pain  After left hip arthroplasty done 1 year ago  Has been to a few sessions of physical therapy but could not continue secondary to losing his insurance and costs  No pain with bilateral facet loading and lateral rotation; negative straight leg raise  X-ray of left hip noncontributory; reasonable to proceed with following interventional plan:    Plan  -Meloxicam 7 5 mg b i d  Prescribed  Patient educated regarding bleeding risk of taking this medication not taking any other nonsteroidal anti-inflammatory medications while taking this medication; counseled thoroughly regarding potential risk of Cardiovascular injury, Kidney injury, Gastrointestinal ulceration/bleeding  Patient voiced understanding  -physical therapy for right-sided hip pain; Core exercises additionally provided for physician directed home PT that the patient plans to participate with for 1 hour, twice a week for the next 6 weeks  -MRI arthrogram left hip (other than metallic hip implants, denies other metal in body  -will refer to spots medicine for further recommendations  -f/u in 4 weeks    There are risks associated with opioid medications, including dependence, addiction and tolerance   The patient understands and agrees to use these medications only as prescribed  Potential side effects of the medications include, but are not limited to, constipation, drowsiness, addiction, impaired judgment and risk of fatal overdose if not taken as prescribed  The patient was warned against driving while taking sedation medications  Sharing medications is a felony  At this point in time, the patient is showing no signs of addiction, abuse, diversion or suicidal ideation  [place] Prescription Drug Monitoring Program report was reviewed and was appropriate     Complete risks and benefits including bleeding, infection, tissue reaction, nerve injury and allergic reaction were discussed  The approach was demonstrated using models and literature was provided  Verbal and written consent was obtained  My impressions and treatment recommendations were discussed in detail with the patient who verbalized understanding and had no further questions  Discharge instructions were provided  I personally saw and examined the patient and I agree with the above discussed plan of care  New Medications Ordered This Visit   Medications    Multiple Vitamin (MULTIVITAMIN PO)     Sig: Take 1 tablet by mouth daily    cyanocobalamin (VITAMIN B-12) 500 MCG tablet     Sig: Take 500 mcg by mouth    meloxicam (MOBIC) 7 5 mg tablet     Sig: Take 1 tablet (7 5 mg total) by mouth daily     Dispense:  60 tablet     Refill:  0       History of Present Illness    Osvaldo Marcum is a 54 y o  male who presents as a referral from the emergency department for evaluation of left hip pain  Patient states he had left hip replaced on August 2020 and has a history of right hip replacement as well  Reports he has continued with pain left hip since arthroplasty one year ago  States he did return to his surgeon in December where he had x-rays which he reports were unremarkable    States surgeon recommended he lose weight and reports he did lose some weight but has noted no improvement of symptoms  He has not returned to see surgeon  He denies any weakness, numbness, paresthesias  Denies any fevers or chills  Denies any swelling  Patient states he did take Tylenol without improvement of symptoms  He denies any recent trauma, fall, injury  He  Endorses aching, nagging, indolent, stabbing, throbbing features of pain with radiation into the groin from his left hip  He has been to 2 sessions of physical therapy without significant improvement  He has had to stop physical therapy secondary to financial constraints and losing incident turns  He has trialed naproxen for the pain without significant benefit  He describes 8/10 pain that is severely debilitating with respect to participate with independent activities of daily living and overall function  He ambulates with a cane and is currently having difficulty seeking employment  I have personally reviewed and/or updated the patient's past medical history, past surgical history, family history, social history, current medications, allergies, and vital signs today  Review of Systems   Constitutional: Positive for activity change  HENT: Negative  Eyes: Negative  Respiratory: Negative  Cardiovascular: Negative  Gastrointestinal: Negative  Endocrine: Negative  Genitourinary: Negative  Musculoskeletal: Positive for arthralgias, gait problem and myalgias  Negative for back pain  Skin: Negative  Allergic/Immunologic: Negative  Neurological: Negative for weakness and numbness  Hematological: Negative  Psychiatric/Behavioral: Negative  All other systems reviewed and are negative  There is no problem list on file for this patient        Past Medical History:   Diagnosis Date    [facility])        Past Surgical History:   Procedure Laterality Date    JOINT REPLACEMENT Left     hip       Family History   Problem Relation Age of Onset    Diabetes Mother     Diabetes Father        Social History     Occupational History    Not on file   Tobacco Use    Smoking status: Current Every Day Smoker     Packs/day: 0 25     Types: Cigarettes    Smokeless tobacco: Never Used   Vaping Use    Vaping Use: Never used   Substance and Sexual Activity    Alcohol use: Not Currently    Drug use: Never    Sexual activity: Not on file       Current Outpatient Medications on File Prior to Visit   Medication Sig    cyanocobalamin (VITAMIN B-12) 500 MCG tablet Take 500 mcg by mouth    Multiple Vitamin (MULTIVITAMIN PO) Take 1 tablet by mouth daily    [DISCONTINUED] atorvastatin (LIPITOR) 20 mg tablet Take by mouth (Patient not taking: Reported on 7/7/2021)    [DISCONTINUED] diclofenac (VOLTAREN) 75 mg EC tablet Take 75 mg by mouth (Patient not taking: Reported on 7/7/2021)    [DISCONTINUED] glimepiride (AMARYL) 2 mg tablet  (Patient not taking: Reported on 7/7/2021)    [DISCONTINUED] ibuprofen (MOTRIN) 800 mg tablet Take 800 mg by mouth every 6 (six) hours as needed (Patient not taking: Reported on 7/7/2021)    [DISCONTINUED] irbesartan (AVAPRO) 150 mg tablet Take by mouth    [DISCONTINUED] lidocaine (LIDODERM) 5 % Apply 1 patch topically daily Remove & Discard patch within 12 hours or as directed by MD    [DISCONTINUED] naproxen (NAPROSYN) 500 mg tablet Take 1 tablet (500 mg total) by mouth 2 (two) times a day with meals for 5 days    [DISCONTINUED] propranolol (INDERAL) 20 mg/5 mL solution Take by mouth     No current facility-administered medications on file prior to visit         Allergies   Allergen Reactions    Charentais Melon (French Melon) Swelling    Other Swelling    Prunus Persica Swelling    Strawberry Extract - Food Allergy Swelling    Metformin Diarrhea         Physical Exam    /88   Pulse 72   Temp 98 °F (36 7 °C)   Resp 20   Ht 6' 3" (1 905 m)   Wt (!) 159 kg (350 lb 6 4 oz)   BMI 43 80 kg/m²     Constitutional: normal, well developed, well nourished, alert, in no distress and non-toxic and no overt pain behavior  and obese  Eyes: anicteric  HEENT: grossly intact  Neck: supple, symmetric, trachea midline and no masses   Pulmonary:even and unlabored  Cardiovascular:No edema or pitting edema present  Skin:Normal without rashes or lesions and well hydrated  Psychiatric:Mood and affect appropriate  Neurologic:Cranial Nerves II-XII grossly intact Sensation grossly intact; no clonus negative onofre's  Reflexes 2+ and brisk  SLR negative bilaterally  Musculoskeletal:   Antalgic gait, ambulates with cane  Pain limited left hip flexion and knee extension  5/5 strength in all other extremities with active range of motion movements bilaterally  Unable to perform normal heel toe and tip toe walking  No pain with lumbar facet loading bilaterally and with lateral spine rotation  No ttp over lumbar paraspinal muscles  Negative sagar's test, negative gaenslen's negative SIJ loading bilaterally  Pain with internal greater than external rotation left hip  Tenderness to palpation over left GTB  Imaging    LEFT HIP     INDICATION: Left hip pain, s/p hip replacement August 2020      COMPARISON:  11/23/2020     VIEWS:  XR HIP/PELV 2-3 VWS LEFT  W PELVIS IF PERFORMED   Images: 4     FINDINGS:     There is no acute fracture or dislocation      Left total hip arthroplasty is identified in satisfactory position without evidence of hardware complication  Included right total hip arthroplasty also unremarkable      No lytic or blastic osseous lesion      Soft tissues are unremarkable      Degenerative changes visualized lower lumbar spine      IMPRESSION:     No acute osseous abnormality

## 2021-07-12 ENCOUNTER — OFFICE VISIT (OUTPATIENT)
Dept: OBGYN CLINIC | Facility: CLINIC | Age: 56
End: 2021-07-12
Payer: COMMERCIAL

## 2021-07-12 VITALS
HEART RATE: 81 BPM | BODY MASS INDEX: 39.17 KG/M2 | SYSTOLIC BLOOD PRESSURE: 132 MMHG | DIASTOLIC BLOOD PRESSURE: 86 MMHG | WEIGHT: 315 LBS | TEMPERATURE: 98 F | HEIGHT: 75 IN

## 2021-07-12 DIAGNOSIS — G89.29 CHRONIC LEFT HIP PAIN: ICD-10-CM

## 2021-07-12 DIAGNOSIS — Z96.642 HISTORY OF TOTAL LEFT HIP REPLACEMENT: ICD-10-CM

## 2021-07-12 DIAGNOSIS — M54.10 RADICULAR PAIN OF LEFT LOWER EXTREMITY: Primary | ICD-10-CM

## 2021-07-12 DIAGNOSIS — M62.838 MUSCLE SPASM OF LEFT LOWER EXTREMITY: ICD-10-CM

## 2021-07-12 DIAGNOSIS — M25.552 CHRONIC LEFT HIP PAIN: ICD-10-CM

## 2021-07-12 DIAGNOSIS — M25.552 LEFT HIP PAIN: ICD-10-CM

## 2021-07-12 PROCEDURE — 99243 OFF/OP CNSLTJ NEW/EST LOW 30: CPT | Performed by: STUDENT IN AN ORGANIZED HEALTH CARE EDUCATION/TRAINING PROGRAM

## 2021-07-12 RX ORDER — GABAPENTIN 300 MG/1
300 CAPSULE ORAL
Qty: 60 CAPSULE | Refills: 0 | Status: SHIPPED | OUTPATIENT
Start: 2021-07-12 | End: 2021-08-04 | Stop reason: SDUPTHER

## 2021-07-12 RX ORDER — MELOXICAM 7.5 MG/1
7.5 TABLET ORAL DAILY
Qty: 60 TABLET | Refills: 1 | Status: SHIPPED | OUTPATIENT
Start: 2021-07-12 | End: 2021-08-04 | Stop reason: SDUPTHER

## 2021-07-12 RX ORDER — SILDENAFIL 100 MG/1
100 TABLET, FILM COATED ORAL DAILY
COMMUNITY
Start: 2021-07-10

## 2021-07-13 ENCOUNTER — EVALUATION (OUTPATIENT)
Dept: PHYSICAL THERAPY | Facility: CLINIC | Age: 56
End: 2021-07-13
Payer: COMMERCIAL

## 2021-07-13 DIAGNOSIS — M25.552 LEFT HIP PAIN: Primary | ICD-10-CM

## 2021-07-13 PROCEDURE — 97110 THERAPEUTIC EXERCISES: CPT | Performed by: PHYSICAL THERAPIST

## 2021-07-13 PROCEDURE — 97162 PT EVAL MOD COMPLEX 30 MIN: CPT | Performed by: PHYSICAL THERAPIST

## 2021-07-13 NOTE — PROGRESS NOTES
PT Evaluation     Today's date: 2021  Patient name: Radha Cope  : 1965  MRN: [id number]  Referring provider: Amie Falk MD  Dx:   Encounter Diagnosis     ICD-10-CM    1  Left hip pain  M25 552                   Assessment  Assessment details: Patient is a 54year old male who presents to PT with c/o pain in left hip  PT examination shows weakness and instability in left hip, Abnormal gait and balance, decreased rom and increased pain limiting functional ability  Patient would benefit from PT intervention including exercises for rom, strength and stability, manual therapy, HEP, functional training, balance/gait training, aerobic conditioning and pain relieving modalities in order to maximize functional independence  Impairments: abnormal gait, abnormal or restricted ROM, activity intolerance, impaired balance, impaired physical strength, lacks appropriate home exercise program, pain with function, safety issue and weight-bearing intolerance    Goals  ST  Initiate HEP  2  Patient to report decreased pain at worst by 25% in 4 weeks    LT  Patient to report decreased pain at worst by 50% in 8 weeks  2   Patient to increase left hip strength to 4+/5 t/o in 8 weeks    Plan  Patient would benefit from: skilled physical therapy and PT eval  Planned modality interventions: cryotherapy and thermotherapy: hydrocollator packs  Other planned modality interventions: Modalities PRN  Planned therapy interventions: IADL retraining, ADL retraining, manual therapy, balance, balance/weight bearing training, neuromuscular re-education, patient education, strengthening, stretching, therapeutic activities, therapeutic exercise, therapeutic training, flexibility, functional ROM exercises, graded activity, graded exercise and home exercise program  Frequency: 2x week  Duration in visits: 8  Duration in weeks: 4  Treatment plan discussed with: patient        Subjective Evaluation    History of Present Illness  Date of onset: 8/26/2020  Mechanism of injury: Patient presents to PT with c/o pain in left hip  Patient had left hip replacement done 8/26/20  Patient also had right hip replaced 2 5 years ago  He had a posterior approach on right side and anterior approach on left side  Patient was only able to have 2 sessions of PT after left THR due to financial reasons  Patient reports he has had issues and pain with left hip since right after surgery  He has difficulty lying on it, sleeping, walking and standing up  Patient was seen by pain management and sports medicine  He is scheduled for an MRI tomorrow  Patient RTD the beginning of August and is now referred to oppt  Pain  Current pain rating: 10  At best pain rating: 10  At worst pain rating: 10  Relieving factors: ice and heat  Aggravating factors: sitting, standing, walking and stair climbing  Progression: worsening      Diagnostic Tests  X-ray: normal  Patient Goals  Patient goals for therapy: decreased pain          Objective     Lumbar Screen  Lumbar range of motion within normal limits  Active Range of Motion   Left Hip   Flexion: 100 degrees   Abduction: 25 degrees   Adduction: WFL  External rotation (90/90): with pain  Internal rotation (90/90): with pain    Right Hip   Normal active range of motion    Strength/Myotome Testing     Left Hip   Planes of Motion   Flexion: 4-  Abduction: 3+  Adduction: 4  External rotation: 4-  Internal rotation: 4-    Right Hip   Normal muscle strength    Ambulation   Weight-Bearing Status   Assistive device used: quad cane    Observational Gait   Gait: antalgic   Decreased walking speed and stride length     Base of support: increased             Precautions: Hx of bilateral THR                Manuals 7/13/21                                       Neuro Re-Ed         Side-Stepping        SLS        [facility]        Tandem Stance                                Ther Ex        Nustep        TR/HR 2x10       Standing SLR 3-way 10x B/L, each       1/4 Squats 10x       Standing Marches        QS        Supine SLR        SAQ                                Ther Activity                        Gait Training                        Modalities

## 2021-07-14 ENCOUNTER — HOSPITAL ENCOUNTER (OUTPATIENT)
Dept: MRI IMAGING | Facility: HOSPITAL | Age: 56
Discharge: HOME | End: 2021-07-14
Attending: ANESTHESIOLOGY
Payer: COMMERCIAL

## 2021-07-14 DIAGNOSIS — Z96.642 CHRONIC HIP PAIN AFTER TOTAL REPLACEMENT OF LEFT HIP JOINT: ICD-10-CM

## 2021-07-14 DIAGNOSIS — G89.29 CHRONIC HIP PAIN AFTER TOTAL REPLACEMENT OF LEFT HIP JOINT: ICD-10-CM

## 2021-07-14 DIAGNOSIS — M25.552 CHRONIC HIP PAIN AFTER TOTAL REPLACEMENT OF LEFT HIP JOINT: ICD-10-CM

## 2021-07-14 PROCEDURE — G1004 CDSM NDSC: HCPCS

## 2021-07-14 PROCEDURE — 73721 MRI JNT OF LWR EXTRE W/O DYE: CPT

## 2021-07-16 NOTE — PROGRESS NOTES
1  Radicular pain of left lower extremity  gabapentin (NEURONTIN) 300 mg capsule    meloxicam (MOBIC) 7 5 mg tablet    Nerve Stimulator (TENS Therapy Pain Relief) HAYDER   2  Chronic left hip pain  Ambulatory referral to Sports Medicine    gabapentin (NEURONTIN) 300 mg capsule    meloxicam (MOBIC) 7 5 mg tablet    Nerve Stimulator (TENS Therapy Pain Relief) HAYDER   3  History of total left hip replacement  Ambulatory referral to Sports Medicine    gabapentin (NEURONTIN) 300 mg capsule    meloxicam (MOBIC) 7 5 mg tablet    Nerve Stimulator (TENS Therapy Pain Relief) HAYDER   4  Muscle spasm of left lower extremity  gabapentin (NEURONTIN) 300 mg capsule    meloxicam (MOBIC) 7 5 mg tablet    Nerve Stimulator (TENS Therapy Pain Relief) HAYDER   5  Left hip pain  gabapentin (NEURONTIN) 300 mg capsule    meloxicam (MOBIC) 7 5 mg tablet    Nerve Stimulator (TENS Therapy Pain Relief) HAYDER     No orders of the defined types were placed in this encounter  Imaging Studies (I personally reviewed images in PACS and report):    Prior imagin  X-ray left hip 2021:  Left total hip arthroplasty is in satisfactory position evidence of hardware complication  Degenerative changes of the lumbar spine is noted  2  X-ray left femur 2021: No acute osseous abnormalities  IMPRESSION:  Chronic left hip pain s/p hip replacement in   - Radiographs note stable position of replacement  Suspected pain is myofascial, radicular  - Other factors: BMI 44    H/o right hip replacement    Consultation evaluation    PLAN:  - Clinical exam and radiographic imaging reviewed with patient today, with impression as per above  - I counseled patient that given his left hip replacement, I recommended against any cortisone injection as it would increase risk of infection around his hip replacement  - I refilled Meloxicam 7 5mg and counseled that he can take 1-2 tablets p o  q day p r n  pain    I counseled can also concurrently take acetaminophen with meloxicam but he could not concurrently take other NSAIDs   - I prescribed gabapentin 300mg PO QHS as there may be a radicular component to his pain  - Prescribed TENS machine unit given his reported muscle spasm complaints of his LLE  - Counseled to continue home PT as suggested by pain management for the next 4-6 weeks  Return in about 4 weeks (around 8/9/2021)  CHIEF COMPLAINT:  Left hip pain    HPI:  Glenda Canavan is a 54 y o  male  who presents in regards to referral from Dr Trinity Solano for       Visit 7/12/2021 :  Initial evaluation of left hip pain: Of note, he has a history of left hip replacement in August 2020 in [place], [place]  He states that since his hip replacement, he has had severe pain of his left hip with limited response from pain medications  He has also had a right hip replacement in which he feels that he has had no issues  He reports seeing his orthopedic surgeon about the issue, but patient states he was told that his hip replacement was in appropriate position and did not warrant further intervention  He is here today further evaluation        Patient states his pain is over the anterior and lateral aspects of his hip in a describes it as  Aching/ stabbing/throbbing/spasming and radiates into his left groin  He feels the pain is constant , severe intensity, and aggravated with any range of motion of his left hip as well as lying down on his left hip  He denies any swelling, erythema, bruising of his left hip  He had attended physical therapy briefly for a couple sessions but did not find that it had been improving his symptoms as well as financial constraints  He reports no relief with Tylenol  He has had some relief with meloxicam after seeing  pain management on 07/07/2021  He reports his pain is affect is a his activities of daily living as he requires a cane to ambulate  [place]   He reports pain has affected his ability to sleep at night              Review of Systems   Constitutional: Negative for chills, diaphoresis and fever  Respiratory: Negative for cough and shortness of breath  Gastrointestinal: Negative for abdominal pain, nausea and vomiting  Musculoskeletal:        As per HPI   Skin: Negative for rash  Neurological:        As per HPI         Medical, Surgical, Family, and Social History    Past Medical History:   Diagnosis Date    Diabetes mellitus ([address] )     Stroke [facility])      Past Surgical History:   Procedure Laterality Date    HIP SURGERY      JOINT REPLACEMENT Left     hip     Social History   Social History     Substance and Sexual Activity   Alcohol Use Not Currently     Social History     Substance and Sexual Activity   Drug Use Never     Social History     Tobacco Use   Smoking Status Current Every Day Smoker    Packs/day: 0 25    Types: Cigarettes   Smokeless Tobacco Never Used     Family History   Problem Relation Age of Onset    Diabetes Mother     Diabetes Father      Allergies   Allergen Reactions    Charentais Melon (Arabic Melon) Swelling    Other Swelling    Prunus Persica Swelling    Strawberry Extract - Food Allergy Swelling    Metformin Diarrhea          Physical Exam  /86   Pulse 81   Temp 98 °F (36 7 °C)   Ht 6' 3" (1 905 m)   Wt (!) 159 kg (350 lb)   BMI 43 75 kg/m²     Constitutional:  see vital signs  Gen: well-developed, normocephalic/atraumatic, well-groomed  Eyes: No inflammation or discharge of conjunctiva or lids; sclera clear   Pharynx: no inflammation, lesion, or mass of lips  Neck: supple, no masses, non-distended  MSK: no inflammation, lesion, mass, or clubbing of nails and digits except for other than mentioned below  SKIN: no visible rashes or skin lesions  Pulmonary/Chest: Effort normal  No respiratory distress     NEURO: cranial nerves grossly intact  PSYCH:  Alert and oriented to person, place, and time; recent and remote memory intact; mood normal, no depression, anxiety, or agitation, judgment and insight good and intact     Left Hip Exam     Tenderness   The patient is experiencing tenderness in the anterior, greater trochanter, posterior and lateral     Range of Motion   Abduction: 40   Adduction: 15 (aggravates hip pain)   Flexion: 110 (aggravates hip pain)   External rotation: 60   Internal rotation: 10 (severely aggravates groin, anterior hip pain)     Muscle Strength   Abduction: 5/5   Adduction: 5/5   Flexion: 4/5     Other   Erythema: absent  Scars: present  Sensation: normal  Pulse: present    Comments:  Overlying scar over anterior hip that appears to be appropriately healing  No bruising, erythema, swelling, open wounds      GaitL antalgic (patient using single point cane)              Procedures

## 2021-07-19 ENCOUNTER — APPOINTMENT (OUTPATIENT)
Dept: PHYSICAL THERAPY | Facility: CLINIC | Age: 56
End: 2021-07-19
Payer: COMMERCIAL

## 2021-07-19 NOTE — PROGRESS NOTES
Daily Note     Today's date: 2021  Patient name: Corby Schneider  : 1965  MRN: [id number]  Referring provider: Alva Briseno MD  Dx:   Encounter Diagnosis     ICD-10-CM    1  Left hip pain  M25 552                   Subjective: ***      Objective: See treatment diary below      Assessment: Tolerated treatment {Tolerated treatment :6470300178}   Patient {assessment:}      Plan: {PLAN:[id number]}     Precautions: Hx of bilateral THR      Manuals 21                                      Neuro Re-Ed         Side-Stepping        SLS        Clamshells        Tandem Stance                                Ther Ex        Nustep        TR/HR 2x10       Standing SLR 3-way 10x B/L, each       1/4 Squats 10x       Standing Marches        QS        Supine SLR        SAQ                                Ther Activity                        Gait Training                        Modalities

## 2021-07-21 ENCOUNTER — APPOINTMENT (OUTPATIENT)
Dept: PHYSICAL THERAPY | Facility: CLINIC | Age: 56
End: 2021-07-21
Payer: COMMERCIAL

## 2021-08-04 ENCOUNTER — OFFICE VISIT (OUTPATIENT)
Dept: PAIN MEDICINE | Facility: CLINIC | Age: 56
End: 2021-08-04
Payer: COMMERCIAL

## 2021-08-04 ENCOUNTER — HOSPITAL ENCOUNTER (OUTPATIENT)
Dept: RADIOLOGY | Facility: HOSPITAL | Age: 56
Discharge: HOME | End: 2021-08-04
Attending: ANESTHESIOLOGY
Payer: COMMERCIAL

## 2021-08-04 VITALS
HEIGHT: 75 IN | HEART RATE: 76 BPM | WEIGHT: 315 LBS | SYSTOLIC BLOOD PRESSURE: 138 MMHG | DIASTOLIC BLOOD PRESSURE: 92 MMHG | BODY MASS INDEX: 39.17 KG/M2 | TEMPERATURE: 98.2 F

## 2021-08-04 DIAGNOSIS — M25.552 LEFT HIP PAIN: ICD-10-CM

## 2021-08-04 DIAGNOSIS — M54.16 LUMBAR RADICULOPATHY: ICD-10-CM

## 2021-08-04 DIAGNOSIS — M54.16 LUMBAR RADICULOPATHY: Primary | ICD-10-CM

## 2021-08-04 DIAGNOSIS — M25.552 CHRONIC LEFT HIP PAIN: ICD-10-CM

## 2021-08-04 DIAGNOSIS — G89.29 CHRONIC LEFT HIP PAIN: ICD-10-CM

## 2021-08-04 DIAGNOSIS — Z96.642 HISTORY OF TOTAL LEFT HIP REPLACEMENT: ICD-10-CM

## 2021-08-04 DIAGNOSIS — M54.10 RADICULAR PAIN OF LEFT LOWER EXTREMITY: ICD-10-CM

## 2021-08-04 DIAGNOSIS — M62.838 MUSCLE SPASM OF LEFT LOWER EXTREMITY: ICD-10-CM

## 2021-08-04 PROCEDURE — 99214 OFFICE O/P EST MOD 30 MIN: CPT | Performed by: ANESTHESIOLOGY

## 2021-08-04 PROCEDURE — 72100 X-RAY EXAM L-S SPINE 2/3 VWS: CPT

## 2021-08-04 RX ORDER — GABAPENTIN 300 MG/1
300 CAPSULE ORAL 3 TIMES DAILY
Qty: 90 CAPSULE | Refills: 0 | Status: SHIPPED | OUTPATIENT
Start: 2021-08-04

## 2021-08-04 RX ORDER — IRBESARTAN 300 MG/1
300 TABLET ORAL DAILY
COMMUNITY
Start: 2021-07-22

## 2021-08-04 RX ORDER — MELOXICAM 7.5 MG/1
7.5 TABLET ORAL DAILY
Qty: 60 TABLET | Refills: 1 | Status: SHIPPED | OUTPATIENT
Start: 2021-08-04

## 2021-08-04 RX ORDER — PROPRANOLOL HYDROCHLORIDE 120 MG/1
120 CAPSULE, EXTENDED RELEASE ORAL DAILY
COMMUNITY
Start: 2021-07-22

## 2021-08-04 RX ORDER — DICLOFENAC SODIUM 75 MG/1
75 TABLET, DELAYED RELEASE ORAL
COMMUNITY
Start: 2021-07-22 | End: 2021-08-04

## 2021-08-04 RX ORDER — GLIMEPIRIDE 2 MG/1
2 TABLET ORAL
COMMUNITY
Start: 2021-07-22

## 2021-08-04 NOTE — PATIENT INSTRUCTIONS
Core Strengthening Exercises   WHAT YOU NEED TO KNOW:   Your core includes the muscles of your lower back, hip, pelvis, and abdomen  Core strengthening exercises help heal and strengthen these muscles  This helps prevent another injury, and keeps your pelvis, spine, and hips in the correct position  DISCHARGE INSTRUCTIONS:   Contact your healthcare provider if:   · You have sharp or worsening pain during exercise or at rest     · You have questions or concerns about your shoulder exercises  Safety tips:  Talk to your healthcare provider before you start an exercise program  A physical therapist can teach you how to do core strengthening exercises safely  · Do the exercises on a mat or firm surface  A firm surface will support your spine and prevent low back pain  Do not do these exercises on a bed  · Move slowly and smoothly  Avoid fast or jerky motions  · Stop if you feel pain  Core exercises should not be painful  Stop if you feel pain  · Breathe normally during core exercises  Do not hold your breath  This may cause an increase in blood pressure and prevent muscle strengthening  Your healthcare provider will tell you when to inhale and exhale during the exercise  · Begin all of your exercises with abdominal bracing  Abdominal bracing helps warm up your core muscles  You can also practice abdominal bracing throughout the day  Lie on your back with your knees bent and feet flat on the floor  Place your arms in a relaxed position beside your body  Tighten your abdominal muscles  Pull your belly button in and up toward your spine  Hold for 5 seconds  Relax your muscles  Repeat 10 times  Core strengthening exercises: Your healthcare provider will tell you how often to do these exercises  The provider will also tell you how many repetitions of each exercise you should do  Hold each exercise for 5 seconds or as directed  As you get stronger, increase your hold to 10 to 15 seconds   You can do some of these exercises on a stability ball, or with a weight  Ask your healthcare provider how to use a stability ball or weight for these exercises:  · Bridging:  Lie on your back with your knees bent and feet flat on the floor  Rest your arms at your side  Tighten your buttocks, and then lift your hips 1 inch off the floor  Hold for 5 seconds  When you can do this exercise without pain for 10 seconds, increase the distance you lift your hips  A good goal is to be able to lift your hips so that your shoulders, hips, and knees are in a straight line  · Dead bug:  Lie on your back with your knees bent and feet flat on the floor  Place your arms in a relaxed position beside your body  Begin with abdominal bracing  Next, raise one leg, keeping your knee bent  Hold for 5 seconds  Repeat with the other leg  When you can do this exercise without pain for 10 to 15 seconds, you may raise one straight leg and hold  Repeat with the other leg  · Quadruped:  Place your hands and knees on the floor  Keep your wrists directly below your shoulders and your knees directly below your hips  Pull your belly button in toward your spine  Do not flatten or arch your back  Tighten your abdominal muscles below your belly button  Hold for 5 seconds  When you can do this exercise without pain for 10 to 15 seconds, you may extend one arm and hold  Repeat on the other side  · Side bridge exercises:      ? Standing side bridge:  Stand next to a wall and extend one arm toward the wall  Place your palm flat on the wall with your fingers pointing upward  Begin with abdominal bracing  Next, without moving your feet, slowly bend your arm to 90 degrees  Hold for 5 seconds  Repeat on the other side  When you can do this exercise without pain for 10 to 15 seconds, you may do the bent leg side bridge on the floor  ? Bent leg side bridge:  Lie on one side with your legs, hips, and shoulders in a straight line   Prop yourself up onto your forearm so your elbow is directly below your shoulder  Bend your knees back to 90 degrees  Begin with abdominal bracing  Next, lift your hips and balance yourself on your forearm and knees  Hold for 5 seconds  Repeat on the other side  When you can do this exercise without pain for 10 to 15 seconds, you may do the straight leg side bridge on the floor  ? Straight leg side bridge:  Lie on one side with your legs, hips, and shoulders in a straight line  Prop yourself up onto your forearm so your elbow is directly below your shoulder  Begin with abdominal bracing  Lift your hips off the floor and balance yourself on your forearm and the outside of your flexed foot  Do not let your ankle bend sideways  Hold for 5 seconds  Repeat on the other side  When you can do this exercise without pain for 10 to 15 seconds, ask your healthcare provider for more advanced exercises  · Superman:  Lie on your stomach  Extend your arms forward on the floor  Tighten your abdominal muscles and lift your right hand and left leg off the floor  Hold this position  Slowly return to the starting position  Tighten your abdominal muscles and lift your left hand and right leg off the floor  Hold this position  Slowly return to the starting position  · Clam:  Lie on your side with your knees bent  Put your bottom arm under your head to keep your neck in line  Put your top hand on your hip to keep your pelvis from moving  Put your heels together, and keep them together during this exercise  Slowly raise your top knee toward the ceiling  Then lower your leg so your knees are together  Repeat this exercise 10 times  Then switch sides and do the exercise 10 times with the other leg  · Curl up:  Lie on your back with your knees bent and feet flat on the floor  Place your hands, palms down, underneath your lower back   Next, with your elbows on the floor, lift your shoulders and chest 2 to 3 inches off the floor  Keep your head in line with your shoulders  Hold this position  Slowly return to the starting position  · Straight leg raises:  Lie on your back with one leg straight  Bend the other knee and place your foot flat on the floor  Tighten your abdominal muscles  Keep your leg straight and slowly lift it straight up 6 to 12 inches off the floor  Hold this position  Lower your leg slowly  Do as many repetitions as directed on this side  Repeat with the other leg  · Plank:  Lie on your stomach  Bend your elbows and place your forearms flat on the floor  Lift your chest, stomach, and knees off the floor  Make sure your elbows are below your shoulders  Your body should be in a straight line  Do not let your hips or lower back sink to the ground  Squeeze your abdominal muscles together and hold for 15 seconds  To make this exercise harder, hold for 30 seconds or lift 1 leg at a time  · Bicycles:  Lie on your back  Bend both knees and bring them toward your chest  Your calves should be parallel to the floor  Place the palms of your hands on the back of your head  Straighten your right leg and keep it lifted 2 inches off the floor  Raise your head and shoulders off the floor and twist towards your left  Keep your head and shoulders lifted  Bend your right knee while you straighten your left leg  Keep your left leg 2 inches off the floor  Twist your head and chest towards the left leg  Continue to straighten 1 leg at a time and twist        Follow up with your healthcare provider as directed:  Write down your questions so you remember to ask them during your visits  © Copyright [employer] 2021 Information is for End User's use only and may not be sold, redistributed or otherwise used for commercial purposes  All illustrations and images included in CareNotes® are the copyrighted property of A D A M [employer] Inc  or [facility] Hakeem Holcomb   The above information is an  only   It is not intended as medical advice for individual conditions or treatments  Talk to your doctor, nurse or pharmacist before following any medical regimen to see if it is safe and effective for you

## 2021-08-04 NOTE — PROGRESS NOTES
Assessment  1  Lumbar radiculopathy  -     X-ray lumbar spine 2 or 3 views; Future; Expected date: 08/04/2021  -     Ambulatory referral to Physical Therapy; Future    2  Chronic left hip pain  -     gabapentin (NEURONTIN) 300 mg capsule; Take 1 capsule (300 mg total) by mouth 3 (three) times a day  -     meloxicam (MOBIC) 7 5 mg tablet; Take 1 tablet (7 5 mg total) by mouth daily Take 1-2 tablets daily as needed for left hip pain    3  History of total left hip replacement  -     gabapentin (NEURONTIN) 300 mg capsule; Take 1 capsule (300 mg total) by mouth 3 (three) times a day  -     meloxicam (MOBIC) 7 5 mg tablet; Take 1 tablet (7 5 mg total) by mouth daily Take 1-2 tablets daily as needed for left hip pain    4  Radicular pain of left lower extremity  -     gabapentin (NEURONTIN) 300 mg capsule; Take 1 capsule (300 mg total) by mouth 3 (three) times a day  -     meloxicam (MOBIC) 7 5 mg tablet; Take 1 tablet (7 5 mg total) by mouth daily Take 1-2 tablets daily as needed for left hip pain    5  Muscle spasm of left lower extremity  -     gabapentin (NEURONTIN) 300 mg capsule; Take 1 capsule (300 mg total) by mouth 3 (three) times a day  -     meloxicam (MOBIC) 7 5 mg tablet; Take 1 tablet (7 5 mg total) by mouth daily Take 1-2 tablets daily as needed for left hip pain    6  Left hip pain  -     gabapentin (NEURONTIN) 300 mg capsule; Take 1 capsule (300 mg total) by mouth 3 (three) times a day  -     meloxicam (MOBIC) 7 5 mg tablet; Take 1 tablet (7 5 mg total) by mouth daily Take 1-2 tablets daily as needed for left hip pain    Pain with internal greater than external rotation of left hip  Pain limited weakness with hip flexion knee extension secondary to debilitating pain  After left hip arthroplasty done 1 year ago  Has been to a few sessions of physical therapy but could not continue secondary to losing his insurance and costs    mild pain with bilateral facet loading and lateral rotation, left greater than right; negative straight leg raise  Patient endorses pain in left L4 dermatomal distribution accompanied by weakness numbness and paresthesias  Will obtain x-ray lumbar spine and have patient initiate physical therapy prior to considering more advanced imaging of low back  X-ray  And MRI of left hip noncontributory; reasonable to proceed with following interventional plan:    Plan  -Meloxicam 7 5 mg b i d  Prescribed  Patient educated regarding bleeding risk of taking this medication not taking any other nonsteroidal anti-inflammatory medications while taking this medication; counseled thoroughly regarding potential risk of Cardiovascular injury, Kidney injury, Gastrointestinal ulceration/bleeding  Patient voiced understanding  -gabapentin 300 mg t i d  Ordered for patient; counseled regarding sedative effects of taking this medication and provided up titration calendar  Counseled not to take medication while driving or operating heavy machinery/using stairs  -physical therapy for left-sided lumbar radiculopathy; Core exercises additionally provided for physician directed home PT that the patient plans to participate with for 1 hour, twice a week for the next 6 weeks  -MRI  Lumbar spine be considered in 4 weeks if he has no benefit from physical therapy; will obtain x-ray lumbar spine today  -f/u in 4 weeks    There are risks associated with opioid medications, including dependence, addiction and tolerance  The patient understands and agrees to use these medications only as prescribed  Potential side effects of the medications include, but are not limited to, constipation, drowsiness, addiction, impaired judgment and risk of fatal overdose if not taken as prescribed  The patient was warned against driving while taking sedation medications  Sharing medications is a felony  At this point in time, the patient is showing no signs of addiction, abuse, diversion or suicidal ideation      [place] Prescription Drug Monitoring Program report was reviewed and was appropriate     Complete risks and benefits including bleeding, infection, tissue reaction, nerve injury and allergic reaction were discussed  The approach was demonstrated using models and literature was provided  Verbal and written consent was obtained  My impressions and treatment recommendations were discussed in detail with the patient who verbalized understanding and had no further questions  Discharge instructions were provided  I personally saw and examined the patient and I agree with the above discussed plan of care  New Medications Ordered This Visit   Medications    glimepiride (AMARYL) 2 mg tablet     Sig: Take 2 mg by mouth    irbesartan (AVAPRO) 300 mg tablet     Sig: Take 300 mg by mouth daily    propranolol (INDERAL LA) 120 mg 24 hr capsule     Sig: Take 120 mg by mouth daily    gabapentin (NEURONTIN) 300 mg capsule     Sig: Take 1 capsule (300 mg total) by mouth 3 (three) times a day     Dispense:  90 capsule     Refill:  0    meloxicam (MOBIC) 7 5 mg tablet     Sig: Take 1 tablet (7 5 mg total) by mouth daily Take 1-2 tablets daily as needed for left hip pain     Dispense:  60 tablet     Refill:  1       History of Present Illness    Cherri Phoenix is a 54 y o  male who presents as a referral from the emergency department for evaluation of left hip pain  Patient states he had left hip replaced on August 2020 and has a history of right hip replacement as well  Reports he has continued with pain left hip extending to the left L4 dermatomal distribution since arthroplasty one year ago  States he did return to his surgeon in December where he had x-rays which he reports were unremarkable  States surgeon recommended he lose weight and reports he did lose some weight but has noted no improvement of symptoms  He has not returned to see surgeon  He endorses occasional weakness, numbness, paresthesias    Denies any fevers or chills  Denies any swelling  Patient states he did take Tylenol without improvement of symptoms  He denies any recent trauma, fall, injury  He  Endorses aching, nagging, indolent, stabbing, throbbing features of pain with radiation into the groin and anterior thigh from his left hip  He has been to 2 sessions of physical therapy without significant improvement  He has had to stop physical therapy secondary to financial constraints and losing  insurance  He has trialed naproxen for the pain without significant benefit  Gabapentin and meloxicam have been providing some element of benefit over the past few weeks  He describes 8/10 pain that is severely debilitating with respect to participate with independent activities of daily living and overall function  He ambulates with a cane and is currently having difficulty seeking employment secondary to his pain and mobility issues  I have personally reviewed and/or updated the patient's past medical history, past surgical history, family history, social history, current medications, allergies, and vital signs today  Review of Systems   Constitutional: Positive for activity change  HENT: Negative  Eyes: Negative  Respiratory: Negative  Cardiovascular: Negative  Gastrointestinal: Negative  Endocrine: Negative  Genitourinary: Negative  Musculoskeletal: Positive for arthralgias, gait problem and myalgias  Negative for back pain  Skin: Negative  Allergic/Immunologic: Negative  Neurological: Negative for weakness and numbness  Hematological: Negative  Psychiatric/Behavioral: Negative  All other systems reviewed and are negative  There is no problem list on file for this patient        Past Medical History:   Diagnosis Date    Diabetes mellitus ([address] )     Stroke [facility])        Past Surgical History:   Procedure Laterality Date    HIP SURGERY      JOINT REPLACEMENT Left     hip       Family History   Problem Relation Age of Onset    Diabetes Mother     Diabetes Father        Social History     Occupational History    Not on file   Tobacco Use    Smoking status: Current Every Day Smoker     Packs/day: 0 25     Types: Cigarettes    Smokeless tobacco: Never Used   Vaping Use    Vaping Use: Never used   Substance and Sexual Activity    Alcohol use: Not Currently    Drug use: Never    Sexual activity: Not on file       Current Outpatient Medications on File Prior to Visit   Medication Sig    cyanocobalamin (VITAMIN B-12) 500 MCG tablet Take 500 mcg by mouth    glimepiride (AMARYL) 2 mg tablet Take 2 mg by mouth    irbesartan (AVAPRO) 300 mg tablet Take 300 mg by mouth daily    Multiple Vitamin (MULTIVITAMIN PO) Take 1 tablet by mouth daily    propranolol (INDERAL LA) 120 mg 24 hr capsule Take 120 mg by mouth daily    sildenafil (VIAGRA) 100 mg tablet Take 100 mg by mouth daily    [DISCONTINUED] diclofenac (VOLTAREN) 75 mg EC tablet Take 75 mg by mouth    [DISCONTINUED] gabapentin (NEURONTIN) 300 mg capsule Take 1 capsule (300 mg total) by mouth daily at bedtime    [DISCONTINUED] meloxicam (MOBIC) 7 5 mg tablet Take 1 tablet (7 5 mg total) by mouth daily Take 1-2 tablets daily as needed for left hip pain    Nerve Stimulator (TENS Therapy Pain Relief) HAYDER Use 1 Device 3 (three) times a day as needed (Apply up to 15 minutes per session as needed for pain) (Patient not taking: Reported on 8/4/2021)     No current facility-administered medications on file prior to visit         Allergies   Allergen Reactions    Charentais Melon (French Melon) Swelling    Other Swelling    Prunus Persica Swelling    Strawberry Extract - Food Allergy Swelling    Metformin Diarrhea         Physical Exam    /92 (BP Location: Left arm, Patient Position: Sitting)   Pulse 76   Temp 98 2 °F (36 8 °C)   Ht 6' 3" (1 905 m)   Wt (!) 159 kg (350 lb)   BMI 43 75 kg/m²     Constitutional: normal, well developed, well nourished, alert, in no distress and non-toxic and no overt pain behavior  and obese  Eyes: anicteric  HEENT: grossly intact  Neck: supple, symmetric, trachea midline and no masses   Pulmonary:even and unlabored  Cardiovascular:No edema or pitting edema present  Skin:Normal without rashes or lesions and well hydrated  Psychiatric:Mood and affect appropriate  Neurologic:Cranial Nerves II-XII grossly intact Sensation grossly intact; no clonus negative onofre's  Reflexes 2+ and brisk  SLR negative bilaterally  Musculoskeletal:   Antalgic gait, ambulates with cane  Pain limited left hip flexion and knee extension  5/5 strength in all other extremities with active range of motion movements bilaterally  Unable to perform normal heel toe and tip toe walking  mild pain with lumbar facet loading bilaterally and with lateral spine rotation, left greater than right  ttp over lumbar paraspinal muscles  Negative sagar's test, negative gaenslen's negative SIJ loading bilaterally  Pain with internal greater than external rotation left hip  Tenderness to palpation over left GTB  Imaging    LEFT HIP     INDICATION: Left hip pain, s/p hip replacement August 2020      COMPARISON:  11/23/2020     VIEWS:  XR HIP/PELV 2-3 VWS LEFT  W PELVIS IF PERFORMED   Images: 4     FINDINGS:     There is no acute fracture or dislocation      Left total hip arthroplasty is identified in satisfactory position without evidence of hardware complication  Included right total hip arthroplasty also unremarkable      No lytic or blastic osseous lesion      Soft tissues are unremarkable      Degenerative changes visualized lower lumbar spine      IMPRESSION:     No acute osseous abnormality

## 2021-08-18 NOTE — PROGRESS NOTES
Patient seen for IE on 7/13/21  Patient cancelled/DNS for next 4 scheduled appt  No further contact from patient to reschedule  Patient to be d/c at this time due to script expiration

## 2021-08-24 NOTE — PROGRESS NOTES
PT Evaluation     Today's date: 2021  Patient name: Amber Barrera  : 1965  MRN: [id number]  Referring provider: Verito Brar MD  Dx:   Encounter Diagnosis     ICD-10-CM    1  Lumbar radiculopathy  M54 16    2  Left hip pain  M25 552    3  Strain of left iliopsoas muscle, initial encounter  [id number]                   Assessment  Assessment details: PT notes the patient with decrease ROM and strength t/o the lumbar spine as well as the left hip and LE with demonstration of impaired gait pattern and balance leading to increase pain levels and functional limitations with need for course of skilled therapy for 6 weeks with focus on lumbar stabilization, manual therapy, strengthening, analgesic modalities, and HEP  PT notes if symptoms do not improve with POC then will refer patient back to MD for further evaluation and treatment  Impairments: abnormal gait, abnormal or restricted ROM, activity intolerance, impaired balance, impaired physical strength, lacks appropriate home exercise program and pain with function  Understanding of Dx/Px/POC: good   Prognosis: good    Goals  ST  Initiate HEP  2  Decrease pain levels by 25-50%   3  Increase ROM by 25-50%   4  Increase strength by 1-2 mm grades  LT  Improve gait pattern and balance to good with least restrictive AD  2  Improve spinal stability with increase trunk/core strength   3  Decrease limitations with standing, walking and stairs  4  Decrease limitations with transfers and ADL  5   DC with HEP    Plan  Plan details: PT notes review of POC and findings with patient who is in agreement with PT recommendations of course of skilled therapy     Patient would benefit from: PT eval and skilled physical therapy  Planned modality interventions: thermotherapy: hydrocollator packs  Planned therapy interventions: manual therapy, neuromuscular re-education, patient education, self care, strengthening, stretching, therapeutic exercise, home exercise program, graded exercise, gait training, flexibility and balance  Frequency: 2x week  Duration in weeks: 6  Treatment plan discussed with: patient        Subjective Evaluation    History of Present Illness  Mechanism of injury: Patient reports development of LB and left hip pain after left THR with feeling of grinding in the left hip  Patient reports pain is located in the front of the hip with feeling of being stuck with limitations with standing, walking, lifting, carrying, transfer, sleep, ADL and recreation  Patient reports he went to pain management for evaluation and referred to OPPT for evaluation and treatment of lumbar radiculopathy  Patient reports he is employed Part-time as  with significant PMH of CVA, Bilateral ALMAZ, DM, depression, and HBP  Pain  Current pain ratin  At best pain ratin  At worst pain ratin  Location: Lumbar spine and Left hip   Quality: tight, pulling, dull ache and discomfort  Relieving factors: rest and change in position  Aggravating factors: stair climbing, walking, standing and lifting      Diagnostic Tests  X-ray: normal  MRI studies: abnormal  Treatments  Current treatment: medication and physical therapy  Patient Goals  Patient goals for therapy: decreased pain, increased motion, increased strength, independence with ADLs/IADLs, return to sport/leisure activities and improved balance          Objective     Palpation   Left   Muscle spasm in the erector spinae, iliopsoas and lumbar paraspinals  Tenderness of the erector spinae, iliopsoas and lumbar paraspinals  Tenderness     Left Hip   Tenderness in the ASIS, PSIS, greater trochanter and sacroiliac joint  Right Hip   No tenderness in the PSIS       Neurological Testing     Reflexes   Left   Patellar (L4): trace (1+)  Achilles (S1): trace (1+)    Right   Patellar (L4): trace (1+)  Achilles (S1): trace (1+)    Active Range of Motion     Lumbar   Flexion: 61 degrees  with pain  Extension: 14 degrees  with pain  Left lateral flexion:  with pain  Right lateral flexion:  WFL and with pain  Left rotation: 17 degrees  with pain  Right rotation: 15 degrees  with pain  Left Hip   Flexion: 21 degrees with pain  Extension: -5 degrees with pain  Abduction: 13 degrees with pain  External rotation (90/90): 15 degrees   Internal rotation (90/90): 0 degrees     Right Hip   Flexion: 53 degrees   Extension: WFL  Abduction: WFL  External rotation (90/90): 24 degrees   Internal rotation (90/90): 0 degrees   Left Knee   Normal active range of motion    Right Knee   Normal active range of motion    Additional Active Range of Motion Details  PT notes decrease ROM and strength t/o left hip and LE     Passive Range of Motion   Left Hip   Flexion: 57 degrees with pain  Extension: -2 degrees with pain  Abduction: WFL and with pain  External rotation (90/90): 28 degrees   Internal rotation (90/90): 0 degrees     Right Hip   Flexion: 61 degrees     Strength/Myotome Testing     Left Hip   Planes of Motion   Flexion: 3+  Extension: 4  Abduction: 4-  Adduction: 4+  External rotation: 3+  Internal rotation: 4-    Right Hip   Normal muscle strength    Left Knee   Flexion: 5  Extension: 4  Quadriceps contraction: good    Right Knee   Normal strength  Quadriceps contraction: good    Tests     Left Hip   Negative DESTINEY, FADIR and long sit  Tanmay: Positive  90/90 SLR: Positive  Ambulation     Observational Gait   Gait: antalgic   Decreased walking speed, stride length and left stance time       Additional Observational Gait Details  PT notes demonstration of impaired gait pattern with decrease hip and knee flex, decrease step length, decrease stance on the left, decrease hip extension, and decrease frank with rating of fair+       Flowsheet Rows      Most Recent Value   PT/OT G-Codes   Current Score  25   Projected Score  39             Precautions:  PMH CVA, bilateral ALMAZ       Manuals 8/25         Left iliopsoas STM with quad stretch and manual hip traction  15 min                                        Neuro Re-Ed          [facility] walk           Side step and squat           Stand OAL           Stand lumbar extension against bar           WP Side step          Supine Tball ABD crunch                     Ther Ex          Nu-step           Bridge           Stand hip extension, march, and hip ABD                                                   HEP update/review  15 min          Ther Activity                              Gait Training                              Modalities          MHP to the left anterior hip in supine  15 min

## 2021-08-25 ENCOUNTER — EVALUATION (OUTPATIENT)
Dept: PHYSICAL THERAPY | Facility: CLINIC | Age: 56
End: 2021-08-25
Payer: COMMERCIAL

## 2021-08-25 DIAGNOSIS — M25.552 LEFT HIP PAIN: ICD-10-CM

## 2021-08-25 DIAGNOSIS — S76.912A STRAIN OF LEFT ILIOPSOAS MUSCLE, INITIAL ENCOUNTER: ICD-10-CM

## 2021-08-25 DIAGNOSIS — M54.16 LUMBAR RADICULOPATHY: Primary | ICD-10-CM

## 2021-08-25 PROCEDURE — 97162 PT EVAL MOD COMPLEX 30 MIN: CPT | Performed by: PHYSICAL THERAPIST

## 2021-08-25 PROCEDURE — 97140 MANUAL THERAPY 1/> REGIONS: CPT | Performed by: PHYSICAL THERAPIST

## 2021-08-25 PROCEDURE — 97535 SELF CARE MNGMENT TRAINING: CPT | Performed by: PHYSICAL THERAPIST

## 2021-09-01 ENCOUNTER — OFFICE VISIT (OUTPATIENT)
Dept: PHYSICAL THERAPY | Facility: CLINIC | Age: 56
End: 2021-09-01
Payer: COMMERCIAL

## 2021-09-01 DIAGNOSIS — M25.552 LEFT HIP PAIN: ICD-10-CM

## 2021-09-01 DIAGNOSIS — M54.16 LUMBAR RADICULOPATHY: Primary | ICD-10-CM

## 2021-09-01 PROCEDURE — 97110 THERAPEUTIC EXERCISES: CPT

## 2021-09-01 PROCEDURE — 97140 MANUAL THERAPY 1/> REGIONS: CPT

## 2021-09-01 PROCEDURE — 97112 NEUROMUSCULAR REEDUCATION: CPT

## 2021-09-01 NOTE — PROGRESS NOTES
Daily Note     Today's date: 2021  Patient name: Adebayo Foote  : 1965  MRN: [id number]  Referring provider: Karen Cadet MD  Dx:   Encounter Diagnosis     ICD-10-CM    1  Lumbar radiculopathy  M54 16    2  Left hip pain  M25 552                   Subjective: Patient reports L hip is feeling a little better, but it is still bothering him  Patient reports back has been feeling okay  Patient reports he gets dizzy when he sneezes or when reaching/looking up  Patient reports pain as 5/10 when sitting and as 7/10 when standing  Objective: See treatment diary below      Assessment: Tolerated treatment well  L hip discomfort noted when performing standing OAL  Patient demonstrated difficulty due to weakness when performing bridges  Patient would benefit from continued PT to increase trunk and L LE strength and mobility for improved function in ADLs  Plan: Continue per plan of care        Precautions:  PMH CVA, bilateral ALMAZ       Manuals         Left iliopsoas STM with quad stretch and manual hip traction  15 min  15 min                                      Neuro Re-Ed          Monster walk   10'x4        Side step and squat   10'x4        Stand OAL   3"x10        Stand lumbar extension against bar   5"x10        WP Side step  20# 10x bilat        Supine Tball ABD crunch   3"x10                  Ther Ex          Nu-step   L1 10'        Bridge   10x        Stand hip extension, march, and hip ABD   2x10 ea                                                HEP update/review  15 min          Ther Activity                              Gait Training                              Modalities          MHP to the left anterior hip in supine  15 min  15 min

## 2021-09-03 ENCOUNTER — OFFICE VISIT (OUTPATIENT)
Dept: PHYSICAL THERAPY | Facility: CLINIC | Age: 56
End: 2021-09-03
Payer: COMMERCIAL

## 2021-09-03 DIAGNOSIS — M54.16 LUMBAR RADICULOPATHY: Primary | ICD-10-CM

## 2021-09-03 DIAGNOSIS — M25.552 LEFT HIP PAIN: ICD-10-CM

## 2021-09-03 DIAGNOSIS — S76.912A STRAIN OF LEFT ILIOPSOAS MUSCLE, INITIAL ENCOUNTER: ICD-10-CM

## 2021-09-03 PROCEDURE — 97140 MANUAL THERAPY 1/> REGIONS: CPT | Performed by: PHYSICAL THERAPIST

## 2021-09-03 PROCEDURE — 97112 NEUROMUSCULAR REEDUCATION: CPT | Performed by: PHYSICAL THERAPIST

## 2021-09-03 PROCEDURE — 97110 THERAPEUTIC EXERCISES: CPT | Performed by: PHYSICAL THERAPIST

## 2021-09-03 NOTE — PROGRESS NOTES
Daily Note     Today's date: 9/3/2021  Patient name: Gagan Brooke  : 1965  MRN: [id number]  Referring provider: Violetta Gibson MD  Dx:   Encounter Diagnosis     ICD-10-CM    1  Lumbar radiculopathy  M54 16    2  Left hip pain  M25 552    3  Strain of left iliopsoas muscle, initial encounter  [id number]                   Subjective:  Patient reports left hip is very sore today with 7/10 pain levels with difficulty with standing and walking activities  Post session, the patient reports feeling looser with 3/10 soreness in the anterior left hip  Objective: See treatment diary below      Assessment: Tolerated treatment well  PT notes modified POC secondary to patient subjective comments but PT will continue to progress toward therapy goals and full POC as tolerated by patient  PT notes continuation of left iliopsoas tightness/inflammation with need for continuation of skilled therapy  Plan: Continue per plan of care        Precautions:  PMH CVA, bilateral ALMAZ       Manuals 8/25 9/1 9/3     Left iliopsoas STM with quad stretch and manual hip traction  15 min  15 min 15 min                              Neuro Re-Ed        Monster walk   10'x4 NT     Side step and squat   10'x4 NT     Stand OAL   3"x10 10x3" Hold   Bilat     Stand lumbar extension against bar   5"x10 10x5" Hold      WP Side step  20# 10x bilat 10x Bilat  25#      Supine Tball ABD crunch   3"x10 15x3" Hold              Ther Ex        Nu-step   L1 10' 10 min L3      Bridge   10x 2x10      Stand hip extension, march, and hip ABD   2x10 ea 10x Bilat  On Foam      Prone hip extension    10x Bilat                              HEP update/review  15 min        Ther Activity                        Gait Training                        Modalities        MHP to the left anterior hip in supine  15 min  15 min 15 min

## 2021-09-08 ENCOUNTER — OFFICE VISIT (OUTPATIENT)
Dept: PHYSICAL THERAPY | Facility: CLINIC | Age: 56
End: 2021-09-08
Payer: COMMERCIAL

## 2021-09-08 DIAGNOSIS — M54.16 LUMBAR RADICULOPATHY: Primary | ICD-10-CM

## 2021-09-08 PROCEDURE — 97110 THERAPEUTIC EXERCISES: CPT

## 2021-09-08 PROCEDURE — 97112 NEUROMUSCULAR REEDUCATION: CPT

## 2021-09-08 PROCEDURE — 97140 MANUAL THERAPY 1/> REGIONS: CPT

## 2021-09-08 NOTE — PROGRESS NOTES
Daily Note     Today's date: 2021  Patient name: Layla Weiss  : 1965  MRN: [id number]   Referring provider: Bety iSu MD  Dx:   Encounter Diagnosis     ICD-10-CM    1  Lumbar radiculopathy  M54 16                   Subjective: Patient reports L hip is a little irritated today- rates pain as 6/10  Patient reports yesterday he was walking and went to turn and lost his balance- patient reports he almost fell, but caught himself  He said "I just felt off "      Objective: See treatment diary below      Assessment: Tolerated treatment fair  Patient demonstrated increased overall fatigue today compared to previous sessions  Good technique noted when performing WP sidesteps and bridges  Patient would benefit from continued PT to increase L hip strength for improved function in daily activities  Plan: Continue per plan of care        Precautions:  PMH CVA, bilateral ALMAZ       Manuals 8/25 9/1 9/3 9/8    Left iliopsoas STM with quad stretch and manual hip traction  15 min  15 min 15 min  15 min                            Neuro Re-Ed        Monster walk   10'x4 NT Red 10'x6    Side step and squat   10'x4 NT Red 10'x6    Stand OAL   3"x10 10x3" Hold   Bilat 10x3" Hold   Bilat    Stand lumbar extension against bar   5"x10 10x5" Hold  10x5" Hold     WP Side step  20# 10x bilat 10x Bilat  25#  10x Bilat  25#     Supine Tball ABD crunch   3"x10 15x3" Hold  15x3" Hold             Ther Ex        Nu-step   L1 10' 10 min L3  10 min L3     Bridge   10x 2x10  2x10    Stand hip extension, march, and hip ABD   2x10 ea 10x Bilat  On Foam  10x Bilat  On Foam     Prone hip extension    10x Bilat  10x bilat                            HEP update/review  15 min        Ther Activity                        Gait Training                        Modalities        MHP to the left anterior hip in supine  15 min  15 min 15 min  15 min

## 2021-09-10 ENCOUNTER — OFFICE VISIT (OUTPATIENT)
Dept: PHYSICAL THERAPY | Facility: CLINIC | Age: 56
End: 2021-09-10
Payer: COMMERCIAL

## 2021-09-10 DIAGNOSIS — M54.16 LUMBAR RADICULOPATHY: Primary | ICD-10-CM

## 2021-09-10 DIAGNOSIS — M25.552 LEFT HIP PAIN: ICD-10-CM

## 2021-09-10 DIAGNOSIS — S76.912A STRAIN OF LEFT ILIOPSOAS MUSCLE, INITIAL ENCOUNTER: ICD-10-CM

## 2021-09-10 PROCEDURE — 97112 NEUROMUSCULAR REEDUCATION: CPT | Performed by: PHYSICAL THERAPIST

## 2021-09-10 PROCEDURE — 97140 MANUAL THERAPY 1/> REGIONS: CPT | Performed by: PHYSICAL THERAPIST

## 2021-09-10 PROCEDURE — 97110 THERAPEUTIC EXERCISES: CPT | Performed by: PHYSICAL THERAPIST

## 2021-09-10 NOTE — PROGRESS NOTES
Daily Note     Today's date: 9/10/2021  Patient name: Boaz Ludwig  : 1965  MRN: [id number]  Referring provider: Darian Hector MD  Dx:   Encounter Diagnosis     ICD-10-CM    1  Lumbar radiculopathy  M54 16    2  Left hip pain  M25 552    3  Strain of left iliopsoas muscle, initial encounter  [id number]                   Subjective:  Patient reports continuation of left hip pain with difficulty with standing and walking activities  Patient reports 7/10 pain levels  Objective: See treatment diary below      Assessment: Tolerated treatment fair  PT notes continuation of progression of POC with focus on gait/balance and manual therapy to decrease pain levels and improve functional limitations to meet therapy goals  PT notes continuation of left iliopsoas tightness and inflammation with need for continuation of skilled therapy  Plan: Continue per plan of care        Precautions:  PMH CVA, bilateral ALMAZ       Manuals 8/25 9/1 9/3 9/8 9/10   Left iliopsoas STM with quad stretch and manual hip traction  15 min  15 min 15 min  15 min 15 min                            Neuro Re-Ed        Monster walk   10'x4 NT Red 10'x6 NT   Side step and squat   10'x4 NT Red 10'x6 NT   Stand OAL   3"x10 10x3" Hold   Bilat 10x3" Hold   Bilat 10x3" Hold   Bilat   Stand lumbar extension against bar   5"x10 10x5" Hold  10x5" Hold  10x5" Hold    WP Side step  20# 10x bilat 10x Bilat  25#  10x Bilat  25#  10x Bilat  25#    Supine Tball ABD crunch   3"x10 15x3" Hold  15x3" Hold  NT           Ther Ex        Nu-step   L1 10' 10 min L3  10 min L3  10 min L3    Bridge   10x 2x10  2x10 NT   Stand hip extension, march, and hip ABD   2x10 ea 10x Bilat  On Foam  10x Bilat  On Foam  NT   Prone hip extension    10x Bilat  10x bilat 2x10 Bilat    Front Lunge      Min squat   10x Bilat                    HEP update/review  15 min        Ther Activity                        Gait Training                        Modalities        [facility] to the left anterior hip in supine  15 min  15 min 15 min  15 min 15 min

## 2021-09-15 ENCOUNTER — OFFICE VISIT (OUTPATIENT)
Dept: PHYSICAL THERAPY | Facility: CLINIC | Age: 56
End: 2021-09-15
Payer: COMMERCIAL

## 2021-09-15 DIAGNOSIS — M54.16 LUMBAR RADICULOPATHY: Primary | ICD-10-CM

## 2021-09-15 DIAGNOSIS — M25.552 LEFT HIP PAIN: ICD-10-CM

## 2021-09-15 DIAGNOSIS — S76.912A STRAIN OF LEFT ILIOPSOAS MUSCLE, INITIAL ENCOUNTER: ICD-10-CM

## 2021-09-15 PROCEDURE — 97110 THERAPEUTIC EXERCISES: CPT

## 2021-09-15 PROCEDURE — 97112 NEUROMUSCULAR REEDUCATION: CPT

## 2021-09-15 PROCEDURE — 97140 MANUAL THERAPY 1/> REGIONS: CPT

## 2021-09-15 NOTE — PROGRESS NOTES
Daily Note     Today's date: 9/15/2021  Patient name: Greg Mtz  : 1965  MRN: [id number]  Referring provider: Floresita Napoles MD  Dx:   Encounter Diagnosis     ICD-10-CM    1  Lumbar radiculopathy  M54 16    2  Left hip pain  M25 552    3  Strain of left iliopsoas muscle, initial encounter  [id number]                   Subjective: Pt states he is a little more sore than he normally is in his hip      Objective: See treatment diary below      Assessment: Pt tolerated treatment session well today  Added A-P hip joint mob with good relief of groin symptoms  Resumed exercises held last session with good results  Pt would benefit from continued OP PT services in order to achieve goals  Plan: Continue per plan of care        Precautions:  PMH CVA, bilateral ALMAZ       Manuals 9/15 9/1 9/3 9/8 9/10   Left iliopsoas STM with quad stretch and manual hip traction, A-P jt mob  15 min 15 min 15 min  15 min 15 min                            Neuro Re-Ed        Monster walk  10'x6 10'x4 NT Red 10'x6 NT   Side step and squat  10'x6 10'x4 NT Red 10'x6 NT   Stand OAL  10x3" Hold   Bilat 3"x10 10x3" Hold   Bilat 10x3" Hold   Bilat 10x3" Hold   Bilat   Stand lumbar extension against bar  10x5" Hold 5"x10 10x5" Hold  10x5" Hold  10x5" Hold    WP Side step 10x Bilat  25#  20# 10x bilat 10x Bilat  25#  10x Bilat  25#  10x Bilat  25#    Supine Tball ABD crunch  15x3" Hold 3"x10 15x3" Hold  15x3" Hold  NT           Ther Ex        Nu-step  10 min L3 L1 10' 10 min L3  10 min L3  10 min L3    Bridge  2x10 10x 2x10  2x10 NT   Stand hip extension, march, and hip ABD  2x10 Bilat  On Foa3m 2x10 ea 10x Bilat  On Foam  10x Bilat  On Foam  NT   Prone hip extension  2x10 Bilat   10x Bilat  10x bilat 2x10 Bilat    Front Lunge  Min squat   10x Bilat    Min squat   10x Bilat                    HEP update/review         Ther Activity                        Gait Training                        Modalities        MHP to the left anterior hip in supine   15 min 15 min 15 min  15 min 15 min

## 2021-09-17 ENCOUNTER — APPOINTMENT (OUTPATIENT)
Dept: PHYSICAL THERAPY | Facility: CLINIC | Age: 56
End: 2021-09-17
Payer: COMMERCIAL

## 2021-09-22 ENCOUNTER — APPOINTMENT (OUTPATIENT)
Dept: PHYSICAL THERAPY | Facility: CLINIC | Age: 56
End: 2021-09-22
Payer: COMMERCIAL

## 2021-09-24 ENCOUNTER — APPOINTMENT (OUTPATIENT)
Dept: PHYSICAL THERAPY | Facility: CLINIC | Age: 56
End: 2021-09-24
Payer: COMMERCIAL

## 2021-09-29 ENCOUNTER — EVALUATION (OUTPATIENT)
Dept: PHYSICAL THERAPY | Facility: CLINIC | Age: 56
End: 2021-09-29
Payer: COMMERCIAL

## 2021-09-29 DIAGNOSIS — M25.552 LEFT HIP PAIN: ICD-10-CM

## 2021-09-29 DIAGNOSIS — M54.16 LUMBAR RADICULOPATHY: Primary | ICD-10-CM

## 2021-09-29 DIAGNOSIS — S76.912A STRAIN OF LEFT ILIOPSOAS MUSCLE, INITIAL ENCOUNTER: ICD-10-CM

## 2021-09-29 PROCEDURE — 97110 THERAPEUTIC EXERCISES: CPT | Performed by: PHYSICAL THERAPIST

## 2021-09-29 PROCEDURE — 97140 MANUAL THERAPY 1/> REGIONS: CPT | Performed by: PHYSICAL THERAPIST

## 2021-09-29 NOTE — PROGRESS NOTES
PT Re-Evaluation     Today's date: 2021  Patient name: Emelina Alicea  : 1965  MRN: [id number]  Referring provider: Shasta Serra MD  Dx:   Encounter Diagnosis     ICD-10-CM    1  Lumbar radiculopathy  M54 16    2  Left hip pain  M25 552    3  Strain of left iliopsoas muscle, initial encounter  [id number]                   Assessment  Assessment details: PT notes the patient with continuation of decrease ROM and strength t/o the lumbar spine as well as the left hip and LE with demonstration of impaired gait pattern and balance leading to increase pain levels and functional limitations with need for continuation of skilled therapy for 4 weeks with focus on lumbar stabilization, manual therapy, strengthening, analgesic modalities, and update/review of HEP  PT notes recommendation to patient to contact pain management MD to schedule f/u appointment for further evaluation of the left hip and Lumbar spine  Impairments: abnormal gait, abnormal or restricted ROM, activity intolerance, impaired balance, impaired physical strength, lacks appropriate home exercise program and pain with function  Understanding of Dx/Px/POC: good   Prognosis: good    Goals  ST  Initiate HEP-MET  2  Decrease pain levels by 25-50%-NOT MET   3  Increase ROM by 25-50%-Partial MET   4  Increase strength by 1-2 mm grades-Partial MET  LT  Improve gait pattern and balance to good with least restrictive AD-Partial MET  2  Improve spinal stability with increase trunk/core strength-Partial MET  3  Decrease limitations with standing, walking and stairs-Partial MET  4  Decrease limitations with transfers and ADL-Partial MET  5   DC with HEP-Progressing     Plan  Plan details: PT notes review of POC and findings with patient who is in agreement with PT recommendations of continuation of skilled therapy     Patient would benefit from: skilled physical therapy  Planned modality interventions: thermotherapy: hydrocollator packs  Planned therapy interventions: manual therapy, neuromuscular re-education, patient education, self care, strengthening, stretching, therapeutic exercise, home exercise program, graded exercise, gait training, flexibility and balance  Frequency: 2x week  Duration in weeks: 4  Treatment plan discussed with: patient        Subjective Evaluation    History of Present Illness  Mechanism of injury: Patient reports development of LB and left hip pain after left THR with feeling of grinding in the left hip  Patient reports pain is located in the front of the hip with feeling of being stuck with limitations with standing, walking, lifting, carrying, transfer, sleep, ADL and recreation  Patient reports he went to pain management for evaluation and referred to OPPT for evaluation and treatment of lumbar radiculopathy  Patient reports he is employed Part-time as  with significant PMH of CVA, Bilateral ALMAZ, DM, depression, and HBP  Presently the patient has attended 6 sessions of skilled therapy and feels minimal change in status since IE  Patient reports continuation of increase pain levels in the left hip with limitations with mobility  Patient reports the leg does feel a little stronger but feels her requires more therapy to continue to decrease pain levels and improve strength  Patient states he does not have a F/U with pain management MD at this time      Pain  Current pain ratin  At best pain rating: 3  At worst pain ratin  Location: Lumbar spine and Left hip   Quality: tight, pulling, dull ache and discomfort  Relieving factors: rest and change in position  Aggravating factors: stair climbing, walking, standing and lifting  Progression: no change      Diagnostic Tests  X-ray: normal  MRI studies: abnormal  Treatments  Current treatment: medication and physical therapy  Patient Goals  Patient goals for therapy: decreased pain, increased motion, increased strength, independence with ADLs/IADLs, return to sport/leisure activities and improved balance          Objective     Palpation   Left   Muscle spasm in the erector spinae, iliopsoas and lumbar paraspinals  Tenderness of the erector spinae, iliopsoas and lumbar paraspinals  Tenderness     Left Hip   Tenderness in the ASIS, PSIS and sacroiliac joint  No tenderness in the greater trochanter  Right Hip   No tenderness in the PSIS       Neurological Testing     Reflexes   Left   Patellar (L4): trace (1+)  Achilles (S1): trace (1+)    Right   Patellar (L4): trace (1+)  Achilles (S1): trace (1+)    Active Range of Motion     Lumbar   Flexion: 66 degrees  with pain  Extension: 24 degrees  with pain  Left lateral flexion:  with pain  Right lateral flexion:  WFL and with pain  Left rotation: 21 degrees  with pain  Right rotation: 20 degrees  with pain  Left Hip   Flexion: 39 degrees with pain  Extension: -1 degrees with pain  Abduction: WFL and with pain  External rotation (90/90): 24 degrees   Internal rotation (90/90): 0 degrees     Right Hip   Flexion: 53 degrees   Extension: WFL  Abduction: WFL  External rotation (90/90): 24 degrees   Internal rotation (90/90): 0 degrees   Left Knee   Normal active range of motion    Right Knee   Normal active range of motion    Additional Active Range of Motion Details  PT notes continuation of decrease ROM and strength t/o left hip and LE     Passive Range of Motion   Left Hip   Flexion: 64 degrees with pain  Extension: 2 degrees with pain  Abduction: WFL and with pain  External rotation (90/90): 27 degrees   Internal rotation (90/90): 0 degrees     Right Hip   Flexion: 61 degrees     Strength/Myotome Testing     Left Hip   Planes of Motion   Flexion: 4  Extension: 4+  Abduction: 4  Adduction: 4+  External rotation: 4  Internal rotation: 4    Right Hip   Normal muscle strength    Left Knee   Flexion: 5  Extension: 4  Quadriceps contraction: good    Right Knee   Normal strength  Quadriceps contraction: good    Tests     Left Hip   Negative DESTINEY, FADIR and long sit  Tanmay: Positive  90/90 SLR: Positive  Ambulation     Observational Gait   Gait: antalgic   Decreased walking speed, stride length and left stance time       Additional Observational Gait Details  PT notes demonstration of impaired gait pattern with decrease hip and knee flex, decrease step length, decrease stance on the left, decrease hip extension, and decrease frank with rating of fair+              Precautions:  PMH CVA, bilateral ALMAZ       Manuals 9/15 9/29  9/8 9/10   Left iliopsoas STM with quad stretch and manual hip traction, A-P jt mob  15 min 15 min   15 min 15 min                            Neuro Re-Ed        Monster walk  10'x6 NT  Red 10'x6 NT   Side step and squat  10'x6 NT  Red 10'x6 NT   Stand OAL  10x3" Hold   Bilat 10x3" Hold   Bilat  10x3" Hold   Bilat 10x3" Hold   Bilat   Stand lumbar extension against bar  10x5" Hold 10x5" Hold   10x5" Hold  10x5" Hold    WP Side step 10x Bilat  25#  NT  10x Bilat  25#  10x Bilat  25#    Supine Tball ABD crunch  15x3" Hold NT  15x3" Hold  NT           Ther Ex        Nu-step  10 min L3 10 min L4   10 min L3  10 min L3    Bridge  2x10 2x10   2x10 NT   Stand hip extension, march, and hip ABD  2x10 Bilat  On Foam NT  10x Bilat  On Foam  NT   Prone hip extension  2x10 Bilat  2x10 Bilat   10x bilat 2x10 Bilat    Front Lunge  Min squat   10x Bilat NT   Min squat   10x Bilat                    HEP update/review         Ther Activity                        Gait Training                        Modalities        MHP to the left anterior hip in supine   15 min 15 min   15 min 15 min

## 2021-10-06 ENCOUNTER — APPOINTMENT (OUTPATIENT)
Dept: PHYSICAL THERAPY | Facility: CLINIC | Age: 56
End: 2021-10-06
Payer: COMMERCIAL

## 2021-10-08 ENCOUNTER — OFFICE VISIT (OUTPATIENT)
Dept: PHYSICAL THERAPY | Facility: CLINIC | Age: 56
End: 2021-10-08
Payer: COMMERCIAL

## 2021-10-08 DIAGNOSIS — M54.16 LUMBAR RADICULOPATHY: Primary | ICD-10-CM

## 2021-10-08 DIAGNOSIS — S76.912A STRAIN OF LEFT ILIOPSOAS MUSCLE, INITIAL ENCOUNTER: ICD-10-CM

## 2021-10-08 DIAGNOSIS — M25.552 LEFT HIP PAIN: ICD-10-CM

## 2021-10-08 PROCEDURE — 97140 MANUAL THERAPY 1/> REGIONS: CPT | Performed by: PHYSICAL THERAPIST

## 2021-10-08 PROCEDURE — 97110 THERAPEUTIC EXERCISES: CPT | Performed by: PHYSICAL THERAPIST

## 2021-10-08 PROCEDURE — 97112 NEUROMUSCULAR REEDUCATION: CPT | Performed by: PHYSICAL THERAPIST

## 2021-10-13 ENCOUNTER — OFFICE VISIT (OUTPATIENT)
Dept: PHYSICAL THERAPY | Facility: CLINIC | Age: 56
End: 2021-10-13
Payer: COMMERCIAL

## 2021-10-13 DIAGNOSIS — M54.16 LUMBAR RADICULOPATHY: Primary | ICD-10-CM

## 2021-10-13 DIAGNOSIS — S76.912A STRAIN OF LEFT ILIOPSOAS MUSCLE, INITIAL ENCOUNTER: ICD-10-CM

## 2021-10-13 DIAGNOSIS — M25.552 LEFT HIP PAIN: ICD-10-CM

## 2021-10-13 PROCEDURE — 97112 NEUROMUSCULAR REEDUCATION: CPT | Performed by: PHYSICAL THERAPIST

## 2021-10-13 PROCEDURE — 97110 THERAPEUTIC EXERCISES: CPT | Performed by: PHYSICAL THERAPIST

## 2021-10-13 PROCEDURE — 97140 MANUAL THERAPY 1/> REGIONS: CPT | Performed by: PHYSICAL THERAPIST

## 2021-10-15 ENCOUNTER — APPOINTMENT (OUTPATIENT)
Dept: PHYSICAL THERAPY | Facility: CLINIC | Age: 56
End: 2021-10-15
Payer: COMMERCIAL

## 2021-10-20 ENCOUNTER — OFFICE VISIT (OUTPATIENT)
Dept: PHYSICAL THERAPY | Facility: CLINIC | Age: 56
End: 2021-10-20
Payer: COMMERCIAL

## 2021-10-20 DIAGNOSIS — S76.912A STRAIN OF LEFT ILIOPSOAS MUSCLE, INITIAL ENCOUNTER: ICD-10-CM

## 2021-10-20 DIAGNOSIS — M25.552 LEFT HIP PAIN: ICD-10-CM

## 2021-10-20 DIAGNOSIS — M54.16 LUMBAR RADICULOPATHY: Primary | ICD-10-CM

## 2021-10-20 PROCEDURE — 97110 THERAPEUTIC EXERCISES: CPT

## 2021-10-20 PROCEDURE — 97140 MANUAL THERAPY 1/> REGIONS: CPT

## 2021-10-20 PROCEDURE — 97112 NEUROMUSCULAR REEDUCATION: CPT

## 2021-10-29 ENCOUNTER — APPOINTMENT (OUTPATIENT)
Dept: PHYSICAL THERAPY | Facility: CLINIC | Age: 56
End: 2021-10-29
Payer: COMMERCIAL

## 2021-11-03 ENCOUNTER — EVALUATION (OUTPATIENT)
Dept: PHYSICAL THERAPY | Facility: CLINIC | Age: 56
End: 2021-11-03
Payer: COMMERCIAL

## 2021-11-03 DIAGNOSIS — M25.552 LEFT HIP PAIN: ICD-10-CM

## 2021-11-03 DIAGNOSIS — M54.16 LUMBAR RADICULOPATHY: Primary | ICD-10-CM

## 2021-11-03 DIAGNOSIS — S76.912A STRAIN OF LEFT ILIOPSOAS MUSCLE, INITIAL ENCOUNTER: ICD-10-CM

## 2021-11-03 PROCEDURE — 97140 MANUAL THERAPY 1/> REGIONS: CPT

## 2021-11-03 PROCEDURE — 97110 THERAPEUTIC EXERCISES: CPT

## 2023-03-05 ENCOUNTER — HOSPITAL ENCOUNTER (EMERGENCY)
Facility: HOSPITAL | Age: 58
Discharge: HOME | End: 2023-03-05
Attending: EMERGENCY MEDICINE

## 2023-03-05 ENCOUNTER — APPOINTMENT (EMERGENCY)
Dept: RADIOLOGY | Facility: HOSPITAL | Age: 58
End: 2023-03-05

## 2023-03-05 VITALS
HEART RATE: 76 BPM | OXYGEN SATURATION: 98 % | TEMPERATURE: 98 F | SYSTOLIC BLOOD PRESSURE: 146 MMHG | WEIGHT: 312.39 LBS | DIASTOLIC BLOOD PRESSURE: 75 MMHG | BODY MASS INDEX: 36.89 KG/M2 | RESPIRATION RATE: 18 BRPM | HEIGHT: 77 IN

## 2023-03-05 DIAGNOSIS — B34.9 VIRAL SYNDROME: Primary | ICD-10-CM

## 2023-03-05 LAB
ANION GAP SERPL CALCULATED.3IONS-SCNC: 7 MMOL/L (ref 4–13)
BASOPHILS # BLD AUTO: 0.04 THOUSANDS/ÂΜL (ref 0–0.1)
BASOPHILS NFR BLD AUTO: 0 % (ref 0–1)
BNP SERPL-MCNC: 30 PG/ML (ref 0–100)
BUN SERPL-MCNC: 16 MG/DL (ref 5–25)
CALCIUM SERPL-MCNC: 9 MG/DL (ref 8.4–10.2)
CARDIAC TROPONIN I PNL SERPL HS: 6 NG/L
CHLORIDE SERPL-SCNC: 107 MMOL/L (ref 96–108)
CO2 SERPL-SCNC: 27 MMOL/L (ref 21–32)
CREAT SERPL-MCNC: 0.78 MG/DL (ref 0.6–1.3)
EOSINOPHIL # BLD AUTO: 0.07 THOUSAND/ÂΜL (ref 0–0.61)
EOSINOPHIL NFR BLD AUTO: 1 % (ref 0–6)
ERYTHROCYTE [DISTWIDTH] IN BLOOD BY AUTOMATED COUNT: 13.3 % (ref 11.6–15.1)
FLUAV RNA RESP QL NAA+PROBE: NEGATIVE
FLUBV RNA RESP QL NAA+PROBE: NEGATIVE
GFR SERPL CREATININE-BSD FRML MDRD: 100 ML/MIN/1.73SQ M
GLUCOSE SERPL-MCNC: 95 MG/DL (ref 65–140)
HCT VFR BLD AUTO: 41.5 % (ref 36.5–49.3)
HGB BLD-MCNC: 13.5 G/DL (ref 12–17)
IMM GRANULOCYTES # BLD AUTO: 0.05 THOUSAND/UL (ref 0–0.2)
IMM GRANULOCYTES NFR BLD AUTO: 1 % (ref 0–2)
LYMPHOCYTES # BLD AUTO: 2.42 THOUSANDS/ÂΜL (ref 0.6–4.47)
LYMPHOCYTES NFR BLD AUTO: 22 % (ref 14–44)
MCH RBC QN AUTO: 28.8 PG (ref 26.8–34.3)
MCHC RBC AUTO-ENTMCNC: 32.5 G/DL (ref 31.4–37.4)
MCV RBC AUTO: 89 FL (ref 82–98)
MONOCYTES # BLD AUTO: 0.72 THOUSAND/ÂΜL (ref 0.17–1.22)
MONOCYTES NFR BLD AUTO: 7 % (ref 4–12)
NEUTROPHILS # BLD AUTO: 7.74 THOUSANDS/ÂΜL (ref 1.85–7.62)
NEUTS SEG NFR BLD AUTO: 69 % (ref 43–75)
NRBC BLD AUTO-RTO: 0 /100 WBCS
PLATELET # BLD AUTO: 222 THOUSANDS/UL (ref 149–390)
PMV BLD AUTO: 10.2 FL (ref 8.9–12.7)
POTASSIUM SERPL-SCNC: 3.8 MMOL/L (ref 3.5–5.3)
RBC # BLD AUTO: 4.69 MILLION/UL (ref 3.88–5.62)
RSV RNA RESP QL NAA+PROBE: NEGATIVE
SARS-COV-2 RNA RESP QL NAA+PROBE: NEGATIVE
SODIUM SERPL-SCNC: 141 MMOL/L (ref 135–147)
WBC # BLD AUTO: 11.04 THOUSAND/UL (ref 4.31–10.16)

## 2023-03-05 RX ORDER — KETOROLAC TROMETHAMINE 30 MG/ML
15 INJECTION, SOLUTION INTRAMUSCULAR; INTRAVENOUS ONCE
Status: COMPLETED | OUTPATIENT
Start: 2023-03-05 | End: 2023-03-05

## 2023-03-05 RX ORDER — LOPERAMIDE HYDROCHLORIDE 2 MG/1
2 CAPSULE ORAL ONCE
Status: COMPLETED | OUTPATIENT
Start: 2023-03-05 | End: 2023-03-05

## 2023-03-05 RX ORDER — PANTOPRAZOLE SODIUM 40 MG/10ML
40 INJECTION, POWDER, LYOPHILIZED, FOR SOLUTION INTRAVENOUS ONCE
Status: COMPLETED | OUTPATIENT
Start: 2023-03-05 | End: 2023-03-05

## 2023-03-05 RX ADMIN — SODIUM CHLORIDE 500 ML: 0.9 INJECTION, SOLUTION INTRAVENOUS at 14:08

## 2023-03-05 RX ADMIN — PANTOPRAZOLE SODIUM 40 MG: 40 INJECTION, POWDER, FOR SOLUTION INTRAVENOUS at 14:08

## 2023-03-05 RX ADMIN — KETOROLAC TROMETHAMINE 15 MG: 30 INJECTION, SOLUTION INTRAMUSCULAR; INTRAVENOUS at 14:08

## 2023-03-05 RX ADMIN — LOPERAMIDE HYDROCHLORIDE 2 MG: 2 CAPSULE ORAL at 14:02

## 2023-03-05 NOTE — Clinical Note
Yanira Josh was seen and treated in our emergency department on 3/5/2023  Diagnosis:     Maya Dean  may return to work on return date  He may return on this date: 03/06/2023         If you have any questions or concerns, please don't hesitate to call        Dario Kaiser,     ______________________________           _______________          _______________  Hospital Representative                              Date                                Time

## 2023-03-05 NOTE — ED PROVIDER NOTES
History  Chief Complaint   Patient presents with   • Flu Symptoms     Patient reports, "I feel terrible " Reports fatigue, chills, nausea, dizziness  States symptoms began this morning  54-year-old male presents the emergency room with a report of fatigue, chills, nausea, dizziness which she reports started this morning  Patient reports that his symptomatology has started after being exposed to 2 individuals who tested positive for COVID-19 on Friday  History provided by:  Patient  Flu Symptoms  Presenting symptoms: diarrhea, fatigue and nausea    Presenting symptoms: no fever, no myalgias, no shortness of breath, no sore throat and no vomiting    Diarrhea:     Quality:  Unable to specify  Fatigue:     Severity:  Severe  Severity:  Moderate  Relieved by:  None tried  Ineffective treatments:  None tried  Associated symptoms: decreased appetite and decreased physical activity    Associated symptoms: no chills and no congestion    Risk factors: diabetes        Prior to Admission Medications   Prescriptions Last Dose Informant Patient Reported? Taking?    Nerve Stimulator (TENS Therapy Pain Relief) HAYDER Not Taking  No No   Sig: Use 1 Device 3 (three) times a day as needed (Apply up to 15 minutes per session as needed for pain)   Patient not taking: Reported on 8/4/2021   cyanocobalamin (VITAMIN B-12) 500 MCG tablet Not Taking  Yes No   Sig: Take 500 mcg by mouth   Patient not taking: Reported on 3/5/2023   gabapentin (NEURONTIN) 300 mg capsule Not Taking  No No   Sig: Take 1 capsule (300 mg total) by mouth 3 (three) times a day   Patient not taking: Reported on 3/5/2023   glimepiride (AMARYL) 2 mg tablet Not Taking  Yes No   Sig: Take 2 mg by mouth   Patient not taking: Reported on 3/5/2023   irbesartan (AVAPRO) 300 mg tablet Not Taking  Yes No   Sig: Take 300 mg by mouth daily   Patient not taking: Reported on 3/5/2023   meloxicam (MOBIC) 7 5 mg tablet Not Taking  No No   Sig: Take 1 tablet (7 5 mg total) by mouth daily Take 1-2 tablets daily as needed for left hip pain   Patient not taking: Reported on 3/5/2023   propranolol (INDERAL LA) 120 mg 24 hr capsule Not Taking  Yes No   Sig: Take 120 mg by mouth daily   Patient not taking: Reported on 3/5/2023   sildenafil (VIAGRA) 100 mg tablet Not Taking  Yes No   Sig: Take 100 mg by mouth daily   Patient not taking: Reported on 3/5/2023      Facility-Administered Medications: None       Past Medical History:   Diagnosis Date   • [facility]    • Stroke [facility])        Past Surgical History:   Procedure Laterality Date   • HIP SURGERY     • JOINT REPLACEMENT Left     hip       Family History   Problem Relation Age of Onset   • Diabetes Mother    • Diabetes Father      I have reviewed and agree with the history as documented  E-Cigarette/Vaping   • E-Cigarette Use Never User      E-Cigarette/Vaping Substances     Social History     Tobacco Use   • Smoking status: Every Day     Packs/day: 0 25     Types: Cigarettes   • Smokeless tobacco: Never   Vaping Use   • Vaping Use: Never used   Substance Use Topics   • Alcohol use: Never   • Drug use: Never       Review of Systems   Constitutional: Positive for appetite change, decreased appetite and fatigue  Negative for activity change, chills, diaphoresis and fever  HENT: Negative for congestion, sinus pressure, sinus pain, sore throat and trouble swallowing  Respiratory: Negative  Negative for chest tightness, shortness of breath and wheezing  Cardiovascular: Negative  Negative for palpitations  Gastrointestinal: Positive for diarrhea and nausea  Negative for vomiting  Genitourinary: Negative  Musculoskeletal: Positive for arthralgias  Negative for myalgias  All other systems reviewed and are negative  Physical Exam  Physical Exam  Vitals and nursing note reviewed  Constitutional:       General: He is in acute distress  Appearance: Normal appearance  He is well-developed  He is obese   He is not ill-appearing or toxic-appearing  HENT:      Head: Normocephalic and atraumatic  Hair is normal       Jaw: No pain on movement  Right Ear: External ear normal       Left Ear: External ear normal       Nose: Nose normal  No congestion  Mouth/Throat:      Mouth: Mucous membranes are moist    Eyes:      General: Lids are normal  No scleral icterus  Extraocular Movements: Extraocular movements intact  Conjunctiva/sclera: Conjunctivae normal       Pupils: Pupils are equal, round, and reactive to light  Cardiovascular:      Rate and Rhythm: Normal rate and regular rhythm  Heart sounds: Normal heart sounds  No murmur heard  Pulmonary:      Effort: Pulmonary effort is normal  No respiratory distress  Breath sounds: Normal breath sounds  No decreased breath sounds, wheezing, rhonchi or rales  Abdominal:      General: Abdomen is flat  There is no distension  Palpations: Abdomen is soft  Abdomen is not rigid  Tenderness: There is abdominal tenderness in the epigastric area and left upper quadrant  There is no guarding or rebound  Musculoskeletal:         General: No swelling, tenderness, deformity or signs of injury  Normal range of motion  Cervical back: Normal range of motion and neck supple  Skin:     General: Skin is warm and dry  Coloration: Skin is not pale  Findings: No rash  Neurological:      General: No focal deficit present  Mental Status: He is alert and oriented to person, place, and time  Mental status is at baseline     Psychiatric:         Attention and Perception: Attention normal          Mood and Affect: Mood normal          Speech: Speech normal          Behavior: Behavior normal          Vital Signs  ED Triage Vitals [03/05/23 1337]   Temperature Pulse Respirations Blood Pressure SpO2   98 °F (36 7 °C) 78 20 162/85 98 %      Temp Source Heart Rate Source Patient Position - Orthostatic VS BP Location FiO2 (%)   Temporal Monitor Lying Right arm --      Pain Score       10 - Worst Possible Pain           Vitals:    03/05/23 1337 03/05/23 1415 03/05/23 1430 03/05/23 1500   BP: 162/85 (!) 154/120 141/75 146/75   Pulse: 78 75 75 76   Patient Position - Orthostatic VS: Lying            Visual Acuity      ED Medications  Medications   ketorolac (TORADOL) injection 15 mg (15 mg Intravenous Given 3/5/23 1408)   pantoprazole (PROTONIX) injection 40 mg (40 mg Intravenous Given 3/5/23 1408)   sodium chloride 0 9 % bolus 500 mL (0 mL Intravenous Stopped 3/5/23 1430)   loperamide (IMODIUM) capsule 2 mg (2 mg Oral Given 3/5/23 1402)       Diagnostic Studies  Results Reviewed     Procedure Component Value Units Date/Time    HS Troponin 0hr (reflex protocol) [[id number]]  (Normal) Collected: 03/05/23 1430    Lab Status: Final result Specimen: Blood from Arm, Right Updated: 03/05/23 1501     hs TnI 0hr 6 ng/L     B-Type Natriuretic Peptide(BNP) [[id number]]  (Normal) Collected: 03/05/23 1408    Lab Status: Final result Specimen: Blood from Arm, Right Updated: 03/05/23 1452     BNP 30 pg/mL     Basic metabolic panel [[id number]] Collected: 03/05/23 1408    Lab Status: Final result Specimen: Blood from Arm, Right Updated: 03/05/23 1434     Sodium 141 mmol/L      Potassium 3 8 mmol/L      Chloride 107 mmol/L      CO2 27 mmol/L      ANION GAP 7 mmol/L      BUN 16 mg/dL      Creatinine 0 78 mg/dL      Glucose 95 mg/dL      Calcium 9 0 mg/dL      eGFR 100 ml/min/1 73sq m     Narrative:      [place] guidelines for Chronic Kidney Disease (CKD):   •  Stage 1 with normal or high GFR (GFR > 90 mL/min/1 73 square meters)  •  Stage 2 Mild CKD (GFR = 60-89 mL/min/1 73 square meters)  •  Stage 3A Moderate CKD (GFR = 45-59 mL/min/1 73 square meters)  •  Stage 3B Moderate CKD (GFR = 30-44 mL/min/1 73 square meters)  •  Stage 4 Severe CKD (GFR = 15-29 mL/min/1 73 square meters)  •  Stage 5 End Stage CKD (GFR <15 mL/min/1 73 square meters)  Note: GFR calculation is accurate only with a steady state creatinine    FLU/RSV/COVID - if FLU/RSV clinically relevant [[id number]]  (Normal) Collected: 03/05/23 1347    Lab Status: Final result Specimen: Nares from Nasopharyngeal Swab Updated: 03/05/23 1428     SARS-CoV-2 Negative     INFLUENZA A PCR Negative     INFLUENZA B PCR Negative     RSV PCR Negative    Narrative:      FOR PEDIATRIC PATIENTS - copy/paste COVID Guidelines URL to browser: [email or web address]    SARS-CoV-2 assay is a Nucleic Acid Amplification assay intended for the  qualitative detection of nucleic acid from SARS-CoV-2 in nasopharyngeal  swabs  Results are for the presumptive identification of SARS-CoV-2 RNA  Positive results are indicative of infection with SARS-CoV-2, the virus  causing COVID-19, but do not rule out bacterial infection or co-infection  with other viruses  Laboratories within the [place] and its  territories are required to report all positive results to the appropriate  public health authorities  Negative results do not preclude SARS-CoV-2  infection and should not be used as the sole basis for treatment or other  patient management decisions  Negative results must be combined with  clinical observations, patient history, and epidemiological information  This test has not been FDA cleared or approved  This test has been authorized by FDA under an Emergency Use Authorization  (EUA)  This test is only authorized for the duration of time the  declaration that circumstances exist justifying the authorization of the  emergency use of an in vitro diagnostic tests for detection of SARS-CoV-2  virus and/or diagnosis of COVID-19 infection under section 564(b)(1) of  the Act, 21 U  S C  633AFM-3(D)(4), unless the authorization is terminated  or revoked sooner  The test has been validated but independent review by FDA  and CLIA is pending      Test performed using [employer] GeneXpert: This RT-PCR assay targets N2,  a region unique to SARS-CoV-2  A conserved region in the E-gene was chosen  for pan-Sarbecovirus detection which includes SARS-CoV-2  According to CMS-2020-01-R, this platform meets the definition of high-throughput technology  CBC and differential [[id number]]  (Abnormal) Collected: 03/05/23 1408    Lab Status: Final result Specimen: Blood from Arm, Right Updated: 03/05/23 1415     WBC 11 04 Thousand/uL      RBC 4 69 Million/uL      Hemoglobin 13 5 g/dL      Hematocrit 41 5 %      MCV 89 fL      MCH 28 8 pg      MCHC 32 5 g/dL      RDW 13 3 %      MPV 10 2 fL      Platelets 540 Thousands/uL      nRBC 0 /100 WBCs      Neutrophils Relative 69 %      Immat GRANS % 1 %      Lymphocytes Relative 22 %      Monocytes Relative 7 %      Eosinophils Relative 1 %      Basophils Relative 0 %      Neutrophils Absolute 7 74 Thousands/µL      Immature Grans Absolute 0 05 Thousand/uL      Lymphocytes Absolute 2 42 Thousands/µL      Monocytes Absolute 0 72 Thousand/µL      Eosinophils Absolute 0 07 Thousand/µL      Basophils Absolute 0 04 Thousands/µL                  XR chest 1 view portable   Final Result by Ct Hirsch MD (03/05 1522)      No acute pulmonary pathology              Workstation performed: [id number]                    Procedures  ECG 12 Lead Documentation Only    Date/Time: 3/5/2023 2:27 PM  Performed by: Luis Vega DO  Authorized by: Luis Vega DO     Indications / Diagnosis:  Chest pain  ECG reviewed by me, the ED Provider: yes    Patient location:  ED  Previous ECG:     Previous ECG:  Compared to current    Comparison ECG info:  No acute change from 2/13/2020    Similarity:  No change  Interpretation:     Interpretation: normal    Rate:     ECG rate assessment: normal    Rhythm:     Rhythm: sinus rhythm    Ectopy:     Ectopy: none    QRS:     QRS axis:  Normal  Conduction:     Conduction: normal    ST segments:     ST segments:  Non-specific  T waves:     T waves: non-specific    Other findings:     Other findings: early repolarization               ED Course  ED Course as of 03/05/23 1540   Sun Mar 05, 2023   1514 hs TnI 0hr: 6   1514 BNP: 30   1514 SARS-COV-2: Negative  Also influenza negative   1525 Chest x-ray negative for acute process  Patient with viral illness  Otherwise stable for discharge  SBIRT 20yo+    Flowsheet Row Most Recent Value   SBIRT (25 yo +)    In order to provide better care to our patients, we are screening all of our patients for alcohol and drug use  Would it be okay to ask you these screening questions? No Filed at: 03/05/2023 1419                    Medical Decision Making  Patient presented to the emergency department and a MSE was performed  The patient was evaluated for complaint related to acute body aches  Patient is potentially at risk for, but not limited to, pneumonia, urinary tract infection, cholecystitis, appendicitis, diverticulitis,cellulitis, otitis media, strep pharyngitis, meningitis, influenza, LVYYL-52 or uncomplicated viral related illness  Several of these diagnoses have been evaluated and ruled out by history and physical   As needed, patient will be further evaluated with laboratory and imaging studies  Higher level diagnostics, such as CT imaging or ultrasound, may also be required  Please see work-up portion of the note for further evaluation of patient's risk  Socioeconomic factors were also considered as part of the decision-making process  Unless otherwise stated in the chart or patient is admitted as elsewhere documented, any deviously prescribed medications will be maintained  Amount and/or Complexity of Data Reviewed  Labs: ordered  Decision-making details documented in ED Course  Radiology: ordered  Risk  Prescription drug management  Risk Details:  At time of discharge, patient was feeling better and was ambulatory and appeared to be in no acute distress with stable vital signs  Disposition  Final diagnoses:   Viral syndrome     Time reflects when diagnosis was documented in both MDM as applicable and the Disposition within this note     Time User Action Codes Description Comment    3/5/2023  3:25 PM Willem Duffy Add [B34 9] Viral syndrome       ED Disposition     ED Disposition   Discharge    Condition   Stable    Date/Time   Sun Mar 5, 2023  3:25 PM    [address] discharge to home/self care  Follow-up Information     Follow up With Specialties Details Why Contact Info    Lisbeth Colby DO Family Medicine In 2 weeks As needed Tere[place]  [facility]  [phone]            Discharge Medication List as of 3/5/2023  3:26 PM      STOP taking these medications       cyanocobalamin (VITAMIN B-12) 500 MCG tablet Comments:   Reason for Stopping:         gabapentin (NEURONTIN) 300 mg capsule Comments:   Reason for Stopping:         glimepiride (AMARYL) 2 mg tablet Comments:   Reason for Stopping:         irbesartan (AVAPRO) 300 mg tablet Comments:   Reason for Stopping:         meloxicam (MOBIC) 7 5 mg tablet Comments:   Reason for Stopping:         Nerve Stimulator (TENS Therapy Pain Relief) HAYDER Comments:   Reason for Stopping:         propranolol (INDERAL LA) 120 mg 24 hr capsule Comments:   Reason for Stopping:         sildenafil (VIAGRA) 100 mg tablet Comments:   Reason for Stopping:               No discharge procedures on file      PDMP Review       Value Time User    PDMP Reviewed  Yes 7/7/2021  7:59 AM Skye Cosme MD          ED Provider  Electronically Signed by           Denny Estrada DO  03/05/23 3854

## 2023-03-06 LAB
ATRIAL RATE: 73 BPM
P AXIS: 50 DEGREES
PR INTERVAL: 158 MS
QRS AXIS: 70 DEGREES
QRSD INTERVAL: 86 MS
QT INTERVAL: 388 MS
QTC INTERVAL: 427 MS
T WAVE AXIS: 65 DEGREES
VENTRICULAR RATE: 73 BPM

## 2023-03-26 ENCOUNTER — APPOINTMENT (EMERGENCY)
Dept: CT IMAGING | Facility: HOSPITAL | Age: 58
End: 2023-03-26

## 2023-03-26 ENCOUNTER — HOSPITAL ENCOUNTER (EMERGENCY)
Facility: HOSPITAL | Age: 58
End: 2023-03-26
Attending: EMERGENCY MEDICINE

## 2023-03-26 ENCOUNTER — HOSPITAL ENCOUNTER (INPATIENT)
Facility: HOSPITAL | Age: 58
LOS: 3 days | Discharge: HOME | End: 2023-03-29
Attending: OBSTETRICS & GYNECOLOGY | Admitting: INTERNAL MEDICINE

## 2023-03-26 ENCOUNTER — APPOINTMENT (INPATIENT)
Dept: NON INVASIVE DIAGNOSTICS | Facility: HOSPITAL | Age: 58
End: 2023-03-26

## 2023-03-26 ENCOUNTER — APPOINTMENT (OUTPATIENT)
Dept: MRI IMAGING | Facility: HOSPITAL | Age: 58
End: 2023-03-26

## 2023-03-26 VITALS
RESPIRATION RATE: 20 BRPM | SYSTOLIC BLOOD PRESSURE: 162 MMHG | OXYGEN SATURATION: 98 % | WEIGHT: 308.86 LBS | HEART RATE: 60 BPM | DIASTOLIC BLOOD PRESSURE: 74 MMHG | BODY MASS INDEX: 36.63 KG/M2 | TEMPERATURE: 97.6 F

## 2023-03-26 DIAGNOSIS — R29.90 STROKE-LIKE SYMPTOM: Primary | ICD-10-CM

## 2023-03-26 DIAGNOSIS — I10 ESSENTIAL HYPERTENSION: ICD-10-CM

## 2023-03-26 DIAGNOSIS — R20.2 RIGHT LEG PARESTHESIAS: ICD-10-CM

## 2023-03-26 DIAGNOSIS — R20.0 NUMBNESS OF RIGHT FOOT: ICD-10-CM

## 2023-03-26 DIAGNOSIS — R20.2 NUMBNESS AND TINGLING: ICD-10-CM

## 2023-03-26 DIAGNOSIS — R29.90 STROKE-LIKE SYMPTOMS: ICD-10-CM

## 2023-03-26 DIAGNOSIS — E11.9 TYPE 2 DIABETES MELLITUS (HCC): Primary | ICD-10-CM

## 2023-03-26 DIAGNOSIS — R20.0 NUMBNESS AND TINGLING: ICD-10-CM

## 2023-03-26 DIAGNOSIS — R93.7 ABNORMAL MRI, CERVICAL SPINE: ICD-10-CM

## 2023-03-26 PROBLEM — I63.9 STROKE (HCC): Status: ACTIVE | Noted: 2020-07-21

## 2023-03-26 PROBLEM — F41.9 ANXIETY AND DEPRESSION: Status: ACTIVE | Noted: 2020-08-26

## 2023-03-26 PROBLEM — F32.A ANXIETY AND DEPRESSION: Status: ACTIVE | Noted: 2020-08-26

## 2023-03-26 PROBLEM — G47.30 SLEEP APNEA: Status: ACTIVE | Noted: 2023-03-26

## 2023-03-26 PROBLEM — F31.9 BIPOLAR 1 DISORDER (HCC): Status: ACTIVE | Noted: 2023-01-17

## 2023-03-26 LAB
ALBUMIN SERPL BCP-MCNC: 4.1 G/DL (ref 3.5–5)
ALP SERPL-CCNC: 64 U/L (ref 34–104)
ALT SERPL W P-5'-P-CCNC: 13 U/L (ref 7–52)
AMPHETAMINES SERPL QL SCN: NEGATIVE
ANION GAP SERPL CALCULATED.3IONS-SCNC: 6 MMOL/L (ref 4–13)
AORTIC ROOT: 3.1 CM
APICAL FOUR CHAMBER EJECTION FRACTION: 61 %
APTT PPP: 30 SECONDS (ref 23–37)
AST SERPL W P-5'-P-CCNC: 20 U/L (ref 13–39)
BARBITURATES UR QL: NEGATIVE
BENZODIAZ UR QL: NEGATIVE
BILIRUB DIRECT SERPL-MCNC: 0.09 MG/DL (ref 0–0.2)
BILIRUB SERPL-MCNC: 0.55 MG/DL (ref 0.2–1)
BUN SERPL-MCNC: 15 MG/DL (ref 5–25)
CALCIUM SERPL-MCNC: 9 MG/DL (ref 8.4–10.2)
CARDIAC TROPONIN I PNL SERPL HS: 6 NG/L
CHLORIDE SERPL-SCNC: 108 MMOL/L (ref 96–108)
CK SERPL-CCNC: 481 U/L (ref 39–308)
CO2 SERPL-SCNC: 26 MMOL/L (ref 21–32)
COCAINE UR QL: NEGATIVE
CREAT SERPL-MCNC: 0.88 MG/DL (ref 0.6–1.3)
E WAVE DECELERATION TIME: 242 MS
ERYTHROCYTE [DISTWIDTH] IN BLOOD BY AUTOMATED COUNT: 13 % (ref 11.6–15.1)
EST. AVERAGE GLUCOSE BLD GHB EST-MCNC: 134 MG/DL
EST. AVERAGE GLUCOSE BLD GHB EST-MCNC: 134 MG/DL
FLUAV RNA RESP QL NAA+PROBE: NEGATIVE
FLUBV RNA RESP QL NAA+PROBE: NEGATIVE
FOLATE SERPL-MCNC: 15.8 NG/ML (ref 3.1–17.5)
FRACTIONAL SHORTENING: 37 (ref 28–44)
GFR SERPL CREATININE-BSD FRML MDRD: 95 ML/MIN/1.73SQ M
GLUCOSE SERPL-MCNC: 103 MG/DL (ref 65–140)
GLUCOSE SERPL-MCNC: 76 MG/DL (ref 65–140)
GLUCOSE SERPL-MCNC: 83 MG/DL (ref 65–140)
GLUCOSE SERPL-MCNC: 88 MG/DL (ref 65–140)
GLUCOSE SERPL-MCNC: 90 MG/DL (ref 65–140)
GLUCOSE SERPL-MCNC: 91 MG/DL (ref 65–140)
GLUCOSE SERPL-MCNC: 98 MG/DL (ref 65–140)
HBA1C MFR BLD: 6.3 %
HBA1C MFR BLD: 6.3 %
HCT VFR BLD AUTO: 42.6 % (ref 36.5–49.3)
HGB BLD-MCNC: 13.9 G/DL (ref 12–17)
INR PPP: 1 (ref 0.84–1.19)
INTERVENTRICULAR SEPTUM IN DIASTOLE (PARASTERNAL SHORT AXIS VIEW): 1.2 CM
INTERVENTRICULAR SEPTUM: 1.2 CM (ref 0.6–1.1)
LAAS-AP2: 20.9 CM2
LAAS-AP4: 23.1 CM2
LEFT ATRIUM AREA SYSTOLE SINGLE PLANE A4C: 19.5 CM2
LEFT ATRIUM SIZE: 3.4 CM
LEFT INTERNAL DIMENSION IN SYSTOLE: 2.6 CM (ref 2.1–4)
LEFT VENTRICULAR INTERNAL DIMENSION IN DIASTOLE: 4.1 CM (ref 3.5–6)
LEFT VENTRICULAR POSTERIOR WALL IN END DIASTOLE: 1.2 CM
LEFT VENTRICULAR STROKE VOLUME: 51 ML
LVSV (TEICH): 51 ML
MCH RBC QN AUTO: 29.2 PG (ref 26.8–34.3)
MCHC RBC AUTO-ENTMCNC: 32.6 G/DL (ref 31.4–37.4)
MCV RBC AUTO: 90 FL (ref 82–98)
METHADONE UR QL: NEGATIVE
MV E'TISSUE VEL-SEP: 11 CM/S
MV PEAK A VEL: 0.72 M/S
MV PEAK E VEL: 89 CM/S
MV STENOSIS PRESSURE HALF TIME: 70 MS
MV VALVE AREA P 1/2 METHOD: 3.14
OPIATES UR QL SCN: NEGATIVE
OXYCODONE+OXYMORPHONE UR QL SCN: NEGATIVE
PCP UR QL: NEGATIVE
PLATELET # BLD AUTO: 250 THOUSANDS/UL (ref 149–390)
PMV BLD AUTO: 9.9 FL (ref 8.9–12.7)
POTASSIUM SERPL-SCNC: 3.9 MMOL/L (ref 3.5–5.3)
PROT SERPL-MCNC: 6.7 G/DL (ref 6.4–8.4)
PROTHROMBIN TIME: 13.3 SECONDS (ref 11.6–14.5)
RBC # BLD AUTO: 4.76 MILLION/UL (ref 3.88–5.62)
RIGHT ATRIUM AREA SYSTOLE A4C: 17.7 CM2
RSV RNA RESP QL NAA+PROBE: NEGATIVE
SARS-COV-2 RNA RESP QL NAA+PROBE: NEGATIVE
SL CV LEFT ATRIUM LENGTH A2C: 5.7 CM
SL CV LV EF: 65
SL CV PED ECHO LEFT VENTRICLE DIASTOLIC VOLUME (MOD BIPLANE) 2D: 76 ML
SL CV PED ECHO LEFT VENTRICLE SYSTOLIC VOLUME (MOD BIPLANE) 2D: 25 ML
SODIUM SERPL-SCNC: 140 MMOL/L (ref 135–147)
THC UR QL: NEGATIVE
TRICUSPID ANNULAR PLANE SYSTOLIC EXCURSION: 2.5 CM
TSH SERPL DL<=0.05 MIU/L-ACNC: 1.2 UIU/ML (ref 0.45–4.5)
VIT B12 SERPL-MCNC: 281 PG/ML (ref 100–900)
WBC # BLD AUTO: 8.7 THOUSAND/UL (ref 4.31–10.16)

## 2023-03-26 RX ORDER — INSULIN LISPRO 100 [IU]/ML
1-5 INJECTION, SOLUTION INTRAVENOUS; SUBCUTANEOUS
Status: DISCONTINUED | OUTPATIENT
Start: 2023-03-26 | End: 2023-03-29 | Stop reason: HOSPADM

## 2023-03-26 RX ORDER — ACETAMINOPHEN 325 MG/1
650 TABLET ORAL EVERY 6 HOURS PRN
Status: DISCONTINUED | OUTPATIENT
Start: 2023-03-26 | End: 2023-03-29 | Stop reason: HOSPADM

## 2023-03-26 RX ORDER — ASPIRIN 325 MG
325 TABLET ORAL DAILY
Status: DISCONTINUED | OUTPATIENT
Start: 2023-03-27 | End: 2023-03-27

## 2023-03-26 RX ORDER — ATORVASTATIN CALCIUM 40 MG/1
40 TABLET, FILM COATED ORAL EVERY EVENING
Status: DISCONTINUED | OUTPATIENT
Start: 2023-03-26 | End: 2023-03-29 | Stop reason: HOSPADM

## 2023-03-26 RX ORDER — ENOXAPARIN SODIUM 100 MG/ML
30 INJECTION SUBCUTANEOUS EVERY 12 HOURS SCHEDULED
Status: DISCONTINUED | OUTPATIENT
Start: 2023-03-26 | End: 2023-03-29 | Stop reason: HOSPADM

## 2023-03-26 RX ORDER — ASPIRIN 325 MG
325 TABLET ORAL ONCE
Status: COMPLETED | OUTPATIENT
Start: 2023-03-26 | End: 2023-03-26

## 2023-03-26 RX ADMIN — ATORVASTATIN CALCIUM 40 MG: 40 TABLET, FILM COATED ORAL at 17:03

## 2023-03-26 RX ADMIN — NICOTINE 7 MG/24 HR DAILY TRANSDERMAL PATCH 1 PATCH: at 11:52

## 2023-03-26 RX ADMIN — ASPIRIN 325 MG ORAL TABLET 325 MG: 325 PILL ORAL at 07:27

## 2023-03-26 RX ADMIN — ENOXAPARIN SODIUM 30 MG: 30 INJECTION SUBCUTANEOUS at 11:52

## 2023-03-26 RX ADMIN — SODIUM CHLORIDE 1000 ML: 0.9 INJECTION, SOLUTION INTRAVENOUS at 07:08

## 2023-03-26 RX ADMIN — ACETAMINOPHEN 325MG 650 MG: 325 TABLET ORAL at 21:29

## 2023-03-26 RX ADMIN — ENOXAPARIN SODIUM 30 MG: 30 INJECTION SUBCUTANEOUS at 21:54
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
## 2023-03-26 NOTE — STROKE DOCUMENTATION
Reviewed transfer consents at bedside with patient, pt signed  Pt voicing concern for transportation back to car after treated at [facility], advised that upon discharge staff would work to assist patient in return

## 2023-03-26 NOTE — ASSESSMENT & PLAN NOTE
Lab Results   Component Value Date    HGBA1C 6 7 (H) 10/20/2021       Recent Labs     03/26/23  0642 03/26/23  0659 03/26/23  1153   POCGLU 98 91 90     · Currently not any medications  · Will re-check HbA1C  · Monitor accuchecks, sliding scale for coverage

## 2023-03-26 NOTE — STROKE DOCUMENTATION
Assumed care of patient at this time, patient resting comfortably in litter, no signs of distress noted

## 2023-03-26 NOTE — PLAN OF CARE
Problem: SAFETY ADULT  Goal: Patient will remain free of falls  Description: INTERVENTIONS:  - Educate patient/family on patient safety including physical limitations  - Instruct patient to call for assistance with activity   - Consult OT/PT to assist with strengthening/mobility   - Keep Call bell within reach  - Keep bed low and locked with side rails adjusted as appropriate  - Keep care items and personal belongings within reach  - Initiate and maintain comfort rounds  - Make Fall Risk Sign visible to staff  - Offer Toileting every 2 Hours, in advance of need  - Initiate/Maintain bed alarm  - Obtain necessary fall risk management equipment: alarms  - Apply yellow socks and bracelet for high fall risk patients  - Consider moving patient to room near nurses station  Outcome: Progressing  Goal: Maintain or return to baseline ADL function  Description: INTERVENTIONS:  -  Assess patient's ability to carry out ADLs; assess patient's baseline for ADL function and identify physical deficits which impact ability to perform ADLs (bathing, care of mouth/teeth, toileting, grooming, dressing, etc )  - Assess/evaluate cause of self-care deficits   - Assess range of motion  - Assess patient's mobility; develop plan if impaired  - Assess patient's need for assistive devices and provide as appropriate  - Encourage maximum independence but intervene and supervise when necessary  - Involve family in performance of ADLs  - Assess for home care needs following discharge   - Consider OT consult to assist with ADL evaluation and planning for discharge  - Provide patient education as appropriate  Outcome: Progressing  Goal: Maintains/Returns to pre admission functional level  Description: INTERVENTIONS:  - Perform BMAT or MOVE assessment daily    - Set and communicate daily mobility goal to care team and patient/family/caregiver     - Collaborate with rehabilitation services on mobility goals if consulted  - Perform Range of Motion 3 times a day  - Reposition patient every 2 hours  - Dangle patient 3 times a day  - Stand patient 3 times a day  - Ambulate patient 3 times a day  - Out of bed to chair 3 times a day   - Out of bed for meals 3 times a day  - Out of bed for toileting  - Record patient progress and toleration of activity level   Outcome: Progressing     Problem: DISCHARGE PLANNING  Goal: Discharge to home or other facility with appropriate resources  Description: INTERVENTIONS:  - Identify barriers to discharge w/patient and caregiver  - Arrange for needed discharge resources and transportation as appropriate  - Identify discharge learning needs (meds, wound care, etc )  - Arrange for interpretive services to assist at discharge as needed  - Refer to Case Management Department for coordinating discharge planning if the patient needs post-hospital services based on physician/advanced practitioner order or complex needs related to functional status, cognitive ability, or social support system  Outcome: Progressing     Problem: Knowledge Deficit  Goal: Patient/family/caregiver demonstrates understanding of disease process, treatment plan, medications, and discharge instructions  Description: Complete learning assessment and assess knowledge base  Interventions:  - Provide teaching at level of understanding  - Provide teaching via preferred learning methods  Outcome: Progressing     Problem: Neurological Deficit  Goal: Neurological status is stable or improving  Description: Interventions:  - Monitor and assess patient's level of consciousness, motor function, sensory function, and level of assistance needed for ADLs  - Monitor and report changes from baseline  Collaborate with interdisciplinary team to initiate plan and implement interventions as ordered  - Provide and maintain a safe environment  - Consider seizure precautions  - Consider fall precautions  - Consider aspiration precautions    - Consider bleeding precautions  Outcome: Progressing     Problem: Activity Intolerance/Impaired Mobility  Goal: Mobility/activity is maintained at optimum level for patient  Description: Interventions:  - Assess and monitor patient  barriers to mobility and need for assistive/adaptive devices  - Assess patient's emotional response to limitations  - Collaborate with interdisciplinary team and initiate plans and interventions as ordered  - Encourage independent activity per ability   - Maintain proper body alignment  - Perform active/passive rom as tolerated/ordered    - Plan activities to conserve energy   - Turn patient as appropriate  Outcome: Progressing

## 2023-03-26 NOTE — LETTER
[address] [place] [address]  Dept: [phone]    March 29, 2023     Patient: Josefa aKy   YOB: 1965   Date of Visit: 3/26/2023       To Whom it May Concern:    Rachelle Almazan is under my professional care  He was seen in the hospital from 3/26/2023 to 03/29/23  He may return to work on 4/1/23 with the following limitations light duty and return without limitations, no restrictions on 4/8/23  If you have any questions or concerns, please don't hesitate to call           Sincerely,          Zee Hurley PA-C

## 2023-03-26 NOTE — ED PROVIDER NOTES
History  Chief Complaint   Patient presents with   • Numbness     Pt states he started last night around 1800 with numbness in the toes on his right foot; states he woke up this morning with increased numbness in right leg and left fingertips     Patient is a 49-year-old male presents the emergency department due to numbness which started in his right toes around 6 PM last evening symptoms of numbness tingling and burning spread to the right foot patient then went to bed and woke up this morning with symptoms spreading up his entire right lower leg and also with new numbness and tingling in his left fingers  No headache no slurred speech no focal weakness in the body  Patient does have a 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  History provided by:  Patient  CVA/TIA-like Symptoms  Presenting symptoms: sensory loss    Presenting symptoms: no headaches and no weakness    Date/time of last known well:  3/25/2023 6:00 PM  Onset quality:  Gradual  Timing:  Constant  Progression:  Worsening  Similar to previous episodes: no    Associated symptoms: no chest pain, no dizziness, no fever, no nausea and no vomiting        None       Past Medical History:   Diagnosis Date   • [facility] mellitus ([facility] )    • Stroke [facility])        Past Surgical History:   Procedure Laterality Date   • HIP SURGERY     • JOINT REPLACEMENT Left     hip       Family History   Problem Relation Age of Onset   • Diabetes Mother    • Diabetes Father      I have reviewed and agree with the history as documented      E-Cigarette/Vaping   • E-Cigarette Use Never User      E-Cigarette/Vaping Substances     Social History     Tobacco Use   • Smoking status: Every Day     Packs/day: 0 25     Types: Cigarettes   • Smokeless tobacco: Never   Vaping Use   • Vaping Use: Never used   Substance Use Topics   • Alcohol use: Never   • Drug use: Never       Review of Systems   Constitutional: Negative for activity change, appetite change, chills, fatigue and fever  HENT: Negative for congestion, ear pain, rhinorrhea and sore throat  Eyes: Negative for discharge, redness and visual disturbance  Respiratory: Negative for cough, chest tightness, shortness of breath and wheezing  Cardiovascular: Negative for chest pain and palpitations  Gastrointestinal: Negative for abdominal pain, constipation, diarrhea, nausea and vomiting  Endocrine: Negative for polydipsia and polyuria  Genitourinary: Negative for difficulty urinating, dysuria, frequency, hematuria and urgency  Musculoskeletal: Negative for arthralgias and myalgias  Skin: Negative for color change, pallor and rash  Neurological: Positive for numbness  Negative for dizziness, speech difficulty, weakness, light-headedness and headaches  Hematological: Negative for adenopathy  Does not bruise/bleed easily  All other systems reviewed and are negative  Physical Exam  Physical Exam  Vitals and nursing note reviewed  Constitutional:       Appearance: He is well-developed  HENT:      Head: Normocephalic and atraumatic  Right Ear: External ear normal       Left Ear: External ear normal       Nose: Nose normal    Eyes:      Conjunctiva/sclera: Conjunctivae normal       Pupils: Pupils are equal, round, and reactive to light  Cardiovascular:      Rate and Rhythm: Normal rate and regular rhythm  Heart sounds: Normal heart sounds  Pulmonary:      Effort: Pulmonary effort is normal  No respiratory distress  Breath sounds: Normal breath sounds  No wheezing or rales  Chest:      Chest wall: No tenderness  Abdominal:      General: Bowel sounds are normal  There is no distension  Palpations: Abdomen is soft  Tenderness: There is no abdominal tenderness  There is no guarding  Musculoskeletal:         General: Normal range of motion  Cervical back: Normal range of motion and neck supple  Skin:     General: Skin is warm and dry     Neurological: Mental Status: He is alert and oriented to person, place, and time  Cranial Nerves: Facial asymmetry present  No cranial nerve deficit or dysarthria  Sensory: Sensory deficit present  Motor: No weakness, abnormal muscle tone or pronator drift  Coordination: Finger-Nose-Finger Test and Heel to Gisela Alva Test normal       Deep Tendon Reflexes: Reflexes abnormal       Reflex Scores:       Brachioradialis reflexes are 2+ on the right side and 2+ on the left side  Patellar reflexes are 1+ on the right side and 1+ on the left side          Vital Signs  ED Triage Vitals   Temperature Pulse Respirations Blood Pressure SpO2   03/26/23 0630 03/26/23 0630 03/26/23 0630 03/26/23 0645 03/26/23 0630   97 6 °F (36 4 °C) 75 18 145/89 98 %      Temp Source Heart Rate Source Patient Position - Orthostatic VS BP Location FiO2 (%)   03/26/23 0630 03/26/23 0630 03/26/23 0630 -- --   Temporal Monitor Lying        Pain Score       --                  Vitals:    03/26/23 0700 03/26/23 0715 03/26/23 0730 03/26/23 0745   BP: 161/86 166/86 (!) 179/92 154/84   Pulse: 73 72 69 68   Patient Position - Orthostatic VS:             Visual Acuity  Visual Acuity    Flowsheet Row Most Recent Value   L Pupil Size (mm) 3   R Pupil Size (mm) 3          ED Medications  Medications   sodium chloride 0 9 % bolus 1,000 mL (1,000 mL Intravenous New Bag 3/26/23 0708)   iohexol (OMNIPAQUE) 350 MG/ML injection (SINGLE-DOSE) 85 mL (has no administration in time range)   aspirin tablet 325 mg (325 mg Oral Given 3/26/23 0727)       Diagnostic Studies  Results Reviewed     Procedure Component Value Units Date/Time    HS Troponin 0hr (reflex protocol) [[id number]]  (Normal) Collected: 03/26/23 0639    Lab Status: Final result Specimen: Blood from Arm, Left Updated: 03/26/23 0712     hs TnI 0hr 6 ng/L     Protime-INR [[id number]]  (Normal) Collected: 03/26/23 0639    Lab Status: Final result Specimen: Blood from Arm, Left Updated: 03/26/23 0487 Protime 13 3 seconds      INR 1 00    APTT [[id number]]  (Normal) Collected: 03/26/23 0639    Lab Status: Final result Specimen: Blood from Arm, Left Updated: 03/26/23 0709     PTT 30 seconds     Basic metabolic panel [[id number]] Collected: 03/26/23 0639    Lab Status: Final result Specimen: Blood from Arm, Left Updated: 03/26/23 0707     Sodium 140 mmol/L      Potassium 3 9 mmol/L      Chloride 108 mmol/L      CO2 26 mmol/L      ANION GAP 6 mmol/L      BUN 15 mg/dL      Creatinine 0 88 mg/dL      Glucose 103 mg/dL      Calcium 9 0 mg/dL      eGFR 95 ml/min/1 73sq m     Narrative:      [place] guidelines for Chronic Kidney Disease (CKD):   •  Stage 1 with normal or high GFR (GFR > 90 mL/min/1 73 square meters)  •  Stage 2 Mild CKD (GFR = 60-89 mL/min/1 73 square meters)  •  Stage 3A Moderate CKD (GFR = 45-59 mL/min/1 73 square meters)  •  Stage 3B Moderate CKD (GFR = 30-44 mL/min/1 73 square meters)  •  Stage 4 Severe CKD (GFR = 15-29 mL/min/1 73 square meters)  •  Stage 5 End Stage CKD (GFR <15 mL/min/1 73 square meters)  Note: GFR calculation is accurate only with a steady state creatinine    Fingerstick Glucose (POCT) [[id number]]  (Normal) Collected: 03/26/23 0659    Lab Status: Final result Updated: 03/26/23 0701     POC Glucose 91 mg/dl     CBC and Platelet [[id number]]  (Normal) Collected: 03/26/23 0639    Lab Status: Final result Specimen: Blood from Arm, Left Updated: 03/26/23 0646     WBC 8 70 Thousand/uL      RBC 4 76 Million/uL      Hemoglobin 13 9 g/dL      Hematocrit 42 6 %      MCV 90 fL      MCH 29 2 pg      MCHC 32 6 g/dL      RDW 13 0 %      Platelets 640 Thousands/uL      MPV 9 9 fL     Fingerstick Glucose (POCT) [[id number]]  (Normal) Collected: 03/26/23 0642    Lab Status: Final result Updated: 03/26/23 0643     POC Glucose 98 mg/dl                  CTA stroke alert (head/neck)   Final Result by Ila Gonzalez MD (03/26 0708)      1    Patent major vasculature of the [place] without high-grade stenosis  No aneurysm  2   No hemodynamically significant stenosis or dissection of cervical carotid and vertebral arteries  3   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  4   Dental caries  Findings were directly discussed with Melissa Mei at 7:00 AM                            Workstation performed: [id number]         CT stroke alert brain   Final Result by Quincy Padron MD (03/26 4881)      No acute intracranial CT abnormality  Findings were directly discussed with Melissa Mei at 7:00 AM       Workstation performed: [id number]                    Procedures  ECG 12 Lead Documentation Only    Date/Time: 3/26/2023 7:28 AM  Performed by: Vini Estrada DO  Authorized by: Vini Estrada DO     ECG reviewed by me, the ED Provider: yes    Patient location:  ED  Quality:     Tracing quality:  Limited by artifact  Rate:     ECG rate:  72    ECG rate assessment: normal    Rhythm:     Rhythm: sinus rhythm    QRS:     QRS axis:  Right    QRS intervals:  Normal  ST segments:     ST segments:  Abnormal (j wave)  T waves:     T waves: normal               ED Course  ED Course as of 03/26/23 0755   Bernestine Essex Mar 26, 2023   0645 Spoke with Dr Sonam Morrow neurology on-call reviewed case and findings in the emergency department advises obtaining CTA for stroke evaluation in the ED however suspect symptoms may be related to some other acute neurologic process  Recommends probable transfer of patient to tertiary facility such as [facility] or [place] for direct neurological evaluation in person  Patient not a candidate for tPA based on onset of symptoms 12 hours ago  0730 Updated recommendations from neurology advised normal CT head and CTA with no large vessel occlusion recommending aspirin and transferred to tertiary facility in network for direct in person neurology evaluation       [address] [place] with Dr Merida [facility] hospitalist on-call reviewed case and findings in the emergency department management thus far and neurology recommendations  Accepts for transfer  Stroke Assessment     Row Name 03/26/23 6661             NIH Stroke Scale    Interval --      Level of Consciousness (1a ) 0      LOC Questions (1b ) 0      LOC Commands (1c ) 0      Best Gaze (2 ) 0      Visual (3 ) 0      Facial Palsy (4 ) 0      Motor Arm, Left (5a ) 0      Motor Arm, Right (5b ) 0      Motor Leg, Left (6a ) 0      Motor Leg, Right (6b ) 0      Limb Ataxia (7 ) 0      Sensory (8 ) 1      Best Language (9 ) 0      Dysarthria (10 ) 0      Extinction and Inattention (11 ) (Formerly Neglect) 0      Total 1              Flowsheet Row Most Recent Value   Thrombolytic Decision Options    Thrombolytic Decision Patient not a candidate  Medical Decision Making  Numbness and tingling: acute illness or injury  Stroke-like symptom: acute illness or injury  Amount and/or Complexity of Data Reviewed  Labs: ordered  Decision-making details documented in ED Course  Radiology: ordered and independent interpretation performed  Decision-making details documented in ED Course  ECG/medicine tests: ordered and independent interpretation performed  Decision-making details documented in ED Course  Risk  OTC drugs  Prescription drug management  Decision regarding hospitalization            Disposition  Final diagnoses:   Stroke-like symptom   Numbness and tingling     Time reflects when diagnosis was documented in both MDM as applicable and the Disposition within this note     Time User Action Codes Description Comment    3/26/2023  6:29 AM Lynn Black Add [R29 90] Stroke-like symptom     3/26/2023  7:13 AM Shelby Coto Add [R20 0,  R20 2] Numbness and tingling       ED Disposition     ED Disposition   Transfer to Another Facility-In Network    Condition   --    Date/Time   Bernestine Essex Mar 26, 2023  7:13 AM    Comment   Joanne Trujillo should be transferred out to [facility]  Follow-up Information    None         Patient's Medications    No medications on file       No discharge procedures on file      PDMP Review       Value Time User    PDMP Reviewed  Yes 7/7/2021  7:59 AM Noelle Pederson MD          ED Provider  Electronically Signed by           Rudy Gibson DO  03/26/23 4903

## 2023-03-26 NOTE — EMTALA/ACUTE CARE TRANSFER
[address] [address]  Dept: [phone]      EMTALA TRANSFER CONSENT    NAME Ramya Acosta                                         1965                              MRN [id number]    I have been informed of my rights regarding examination, treatment, and transfer   by Dr Sarah Shaver DO    Benefits: Specialized equipment and/or services available at the receiving facility (Include comment)________________________ (In person neurology evaluation )    Risks: Potential for delay in receiving treatment, Potential deterioration of medical condition, Loss of IV, Possible worsening of condition or death during transfer, Increased discomfort during transfer      Consent for Transfer:  I acknowledge that my medical condition has been evaluated and explained to me by the emergency department physician or other qualified medical person and/or my attending physician, who has recommended that I be transferred to the service of  Accepting Physician: Dr Shantel Diaz at [address] [place] [address] Name, [facility] : [address]  The above potential benefits of such transfer, the potential risks associated with such transfer, and the probable risks of not being transferred have been explained to me, and I fully understand them  The doctor has explained that, in my case, the benefits of transfer outweigh the risks  I agree to be transferred  I authorize the performance of emergency medical procedures and treatments upon me in both transit and upon arrival at the receiving facility  Additionally, I authorize the release of any and all medical records to the receiving facility and request they be transported with me, if possible  I understand that the safest mode of transportation during a medical emergency is an ambulance and that the Hospital advocates the use of this mode of transport   Risks of traveling to the receiving facility by car, including absence of medical control, life sustaining equipment, such as oxygen, and medical personnel has been explained to me and I fully understand them  (JOSE CORRECT BOX BELOW)  [  ]  I consent to the stated transfer and to be transported by ambulance/helicopter  [  ]  I consent to the stated transfer, but refuse transportation by ambulance and accept full responsibility for my transportation by car  I understand the risks of non-ambulance transfers and I exonerate the Hospital and its staff from any deterioration in my condition that results from this refusal     X___________________________________________    DATE  23  TIME________  Signature of patient or legally responsible individual signing on patient behalf           RELATIONSHIP TO PATIENT_________________________          Provider Certification    NAME Brayan Urias                                        [facility] 1965                              MRN [id number]    A medical screening exam was performed on the above named patient  Based on the examination:    Condition Necessitating Transfer The primary encounter diagnosis was Stroke-like symptom  A diagnosis of Numbness and tingling was also pertinent to this visit      Patient Condition: The patient has been stabilized such that within reasonable medical probability, no material deterioration of the patient condition or the condition of the unborn child(fay) is likely to result from the transfer    Reason for Transfer:      Transfer Requirements: [facility] [address]   · Space available and qualified personnel available for treatment as acknowledged by    · Agreed to accept transfer and to provide appropriate medical treatment as acknowledged by       Dr Jaxson Berry  · Appropriate medical records of the examination and treatment of the patient are provided at the time of transfer   [address] _______  · Transfer will be performed by qualified personnel from [address] [facility]  and appropriate transfer equipment as required, including the use of necessary and appropriate life support measures  Provider Certification: I have examined the patient and explained the following risks and benefits of being transferred/refusing transfer to the patient/family:         Based on these reasonable risks and benefits to the patient and/or the unborn child(fay), and based upon the information available at the time of the patient’s examination, I certify that the medical benefits reasonably to be expected from the provision of appropriate medical treatments at another medical facility outweigh the increasing risks, if any, to the individual’s medical condition, and in the case of labor to the unborn child, from effecting the transfer      X____________________________________________ DATE 03/26/23        TIME_______      ORIGINAL - SEND TO MEDICAL RECORDS   COPY - SEND WITH PATIENT DURING TRANSFER

## 2023-03-26 NOTE — ASSESSMENT & PLAN NOTE
This is a 58-year old male with history of hypertension, diabetes mellitus, CVA with residual right facial palsy presented intially to [facility] for right foot numbness started 6pm last night which progressed up to knee and left fingertip tingling  Denies any extremity weakness, bowel or urinary incontinence, chest pain, dyspnea   Denies any fever or chills    · CT negative for any acute abnormality  · CTA negative or high-grade stenosis  · Stroke pathway  · Neurochecks, telemetry monitoring  · MRI brain, MRI C-spine with and without contrast  · TTE  · ASA, Statin  · Neurology consultation will be appreciated  · PT/OT/ST

## 2023-03-27 ENCOUNTER — APPOINTMENT (OUTPATIENT)
Dept: MRI IMAGING | Facility: HOSPITAL | Age: 58
End: 2023-03-27

## 2023-03-27 PROBLEM — R20.2 RIGHT LEG PARESTHESIAS: Status: ACTIVE | Noted: 2023-03-27

## 2023-03-27 LAB
ALBUMIN SERPL BCP-MCNC: 3.7 G/DL (ref 3.5–5)
ALBUMIN SERPL ELPH-MCNC: 4.15 G/DL (ref 3.5–5)
ALBUMIN SERPL ELPH-MCNC: 65.8 % (ref 52–65)
ALP SERPL-CCNC: 53 U/L (ref 34–104)
ALPHA1 GLOB SERPL ELPH-MCNC: 0.25 G/DL (ref 0.1–0.4)
ALPHA1 GLOB SERPL ELPH-MCNC: 4 % (ref 2.5–5)
ALPHA2 GLOB SERPL ELPH-MCNC: 0.57 G/DL (ref 0.4–1.2)
ALPHA2 GLOB SERPL ELPH-MCNC: 9.1 % (ref 7–13)
ALT SERPL W P-5'-P-CCNC: 11 U/L (ref 7–52)
ANION GAP SERPL CALCULATED.3IONS-SCNC: 4 MMOL/L (ref 4–13)
AST SERPL W P-5'-P-CCNC: 15 U/L (ref 13–39)
ATRIAL RATE: 72 BPM
B BURGDOR IGG+IGM SER-ACNC: <0.2 AI
BASOPHILS # BLD AUTO: 0.03 THOUSANDS/ÂΜL (ref 0–0.1)
BASOPHILS NFR BLD AUTO: 0 % (ref 0–1)
BETA GLOB ABNORMAL SERPL ELPH-MCNC: 0.3 G/DL (ref 0.4–0.8)
BETA1 GLOB SERPL ELPH-MCNC: 4.8 % (ref 5–13)
BETA2 GLOB SERPL ELPH-MCNC: 5.2 % (ref 2–8)
BETA2+GAMMA GLOB SERPL ELPH-MCNC: 0.33 G/DL (ref 0.2–0.5)
BILIRUB SERPL-MCNC: 0.48 MG/DL (ref 0.2–1)
BUN SERPL-MCNC: 14 MG/DL (ref 5–25)
CALCIUM SERPL-MCNC: 8.4 MG/DL (ref 8.4–10.2)
CHLORIDE SERPL-SCNC: 107 MMOL/L (ref 96–108)
CHOLEST SERPL-MCNC: 195 MG/DL
CK SERPL-CCNC: 266 U/L (ref 39–308)
CO2 SERPL-SCNC: 27 MMOL/L (ref 21–32)
CREAT SERPL-MCNC: 0.87 MG/DL (ref 0.6–1.3)
EOSINOPHIL # BLD AUTO: 0.15 THOUSAND/ÂΜL (ref 0–0.61)
EOSINOPHIL NFR BLD AUTO: 2 % (ref 0–6)
ERYTHROCYTE [DISTWIDTH] IN BLOOD BY AUTOMATED COUNT: 13 % (ref 11.6–15.1)
GAMMA GLOB ABNORMAL SERPL ELPH-MCNC: 0.7 G/DL (ref 0.5–1.6)
GAMMA GLOB SERPL ELPH-MCNC: 11.1 % (ref 12–22)
GFR SERPL CREATININE-BSD FRML MDRD: 95 ML/MIN/1.73SQ M
GLUCOSE SERPL-MCNC: 78 MG/DL (ref 65–140)
GLUCOSE SERPL-MCNC: 93 MG/DL (ref 65–140)
GLUCOSE SERPL-MCNC: 93 MG/DL (ref 65–140)
GLUCOSE SERPL-MCNC: 94 MG/DL (ref 65–140)
GLUCOSE SERPL-MCNC: 96 MG/DL (ref 65–140)
HCT VFR BLD AUTO: 41.1 % (ref 36.5–49.3)
HDLC SERPL-MCNC: 48 MG/DL
HGB BLD-MCNC: 13.3 G/DL (ref 12–17)
HIV 1+2 AB+HIV1 P24 AG SERPL QL IA: NORMAL
HIV 2 AB SERPL QL IA: NORMAL
HIV1 AB SERPL QL IA: NORMAL
HIV1 P24 AG SERPL QL IA: NORMAL
IGG/ALB SER: 1.92 {RATIO} (ref 1.1–1.8)
IMM GRANULOCYTES # BLD AUTO: 0.01 THOUSAND/UL (ref 0–0.2)
IMM GRANULOCYTES NFR BLD AUTO: 0 % (ref 0–2)
LDLC SERPL CALC-MCNC: 126 MG/DL (ref 0–100)
LYMPHOCYTES # BLD AUTO: 2.45 THOUSANDS/ÂΜL (ref 0.6–4.47)
LYMPHOCYTES NFR BLD AUTO: 32 % (ref 14–44)
MCH RBC QN AUTO: 29.2 PG (ref 26.8–34.3)
MCHC RBC AUTO-ENTMCNC: 32.4 G/DL (ref 31.4–37.4)
MCV RBC AUTO: 90 FL (ref 82–98)
MONOCYTES # BLD AUTO: 0.59 THOUSAND/ÂΜL (ref 0.17–1.22)
MONOCYTES NFR BLD AUTO: 8 % (ref 4–12)
NEUTROPHILS # BLD AUTO: 4.45 THOUSANDS/ÂΜL (ref 1.85–7.62)
NEUTS SEG NFR BLD AUTO: 58 % (ref 43–75)
NRBC BLD AUTO-RTO: 0 /100 WBCS
P AXIS: 62 DEGREES
PLATELET # BLD AUTO: 231 THOUSANDS/UL (ref 149–390)
PMV BLD AUTO: 10 FL (ref 8.9–12.7)
POTASSIUM SERPL-SCNC: 4 MMOL/L (ref 3.5–5.3)
PR INTERVAL: 160 MS
PROT PATTERN SERPL ELPH-IMP: ABNORMAL
PROT SERPL-MCNC: 6.2 G/DL (ref 6.4–8.4)
PROT SERPL-MCNC: 6.3 G/DL (ref 6.4–8.2)
QRS AXIS: 85 DEGREES
QRSD INTERVAL: 86 MS
QT INTERVAL: 394 MS
QTC INTERVAL: 431 MS
RBC # BLD AUTO: 4.56 MILLION/UL (ref 3.88–5.62)
SODIUM SERPL-SCNC: 138 MMOL/L (ref 135–147)
T WAVE AXIS: 84 DEGREES
TRIGL SERPL-MCNC: 106 MG/DL
VENTRICULAR RATE: 72 BPM
WBC # BLD AUTO: 7.68 THOUSAND/UL (ref 4.31–10.16)

## 2023-03-27 RX ORDER — AMLODIPINE BESYLATE 2.5 MG/1
2.5 TABLET ORAL DAILY
Status: DISCONTINUED | OUTPATIENT
Start: 2023-03-27 | End: 2023-03-29 | Stop reason: HOSPADM

## 2023-03-27 RX ORDER — ASPIRIN 81 MG/1
81 TABLET ORAL DAILY
Status: DISCONTINUED | OUTPATIENT
Start: 2023-03-27 | End: 2023-03-29 | Stop reason: HOSPADM

## 2023-03-27 RX ADMIN — ACETAMINOPHEN 325MG 650 MG: 325 TABLET ORAL at 04:04

## 2023-03-27 RX ADMIN — GADOBUTROL 14 ML: 604.72 INJECTION INTRAVENOUS at 08:11

## 2023-03-27 RX ADMIN — AMLODIPINE BESYLATE 2.5 MG: 2.5 TABLET ORAL at 16:32

## 2023-03-27 RX ADMIN — ENOXAPARIN SODIUM 30 MG: 30 INJECTION SUBCUTANEOUS at 20:42

## 2023-03-27 RX ADMIN — ASPIRIN 325 MG ORAL TABLET 325 MG: 325 PILL ORAL at 08:41

## 2023-03-27 RX ADMIN — ATORVASTATIN CALCIUM 40 MG: 40 TABLET, FILM COATED ORAL at 17:07

## 2023-03-27 NOTE — ASSESSMENT & PLAN NOTE
· Not on any medications prior to arrival  · BPs running in the 150s here  · Await neurology recommendations

## 2023-03-27 NOTE — ASSESSMENT & PLAN NOTE
Lab Results   Component Value Date    HGBA1C 6 3 (H) 03/26/2023     Recent Labs     03/26/23  1537 03/26/23  1539 03/26/23 2044 03/27/23  0825   POCGLU 83 76 88 93   · Currently not any medications as an outpt  · HbA1C is 6 3   · Blood sugars appearing controlled here

## 2023-03-27 NOTE — ASSESSMENT & PLAN NOTE
62 y o  male with prior stroke with residual right facial weakness, HTN, HLD, DM, MIREILLE, obesity, and bipolar 1 disorder who initially presented to [facility] on 3/26/2023 as a stroke alert for numbness/tingling in the right foot that progressed to the distal right lower extremity and tingling in the left fingers  Upon arrival to the ED, /89  NIHSS 2 per ED notes (chronic right facial weakness and new sensory deficits)  CT head/CTA head/neck unremarkable  Patient was not a candidate for TNK due to being outside the time window  Patient was transferred to [address] [address] for in person neurology evaluation  · MRI brain negative for acute infarct  Did note small area of cortical gliosis in the left frontal lobe anteriorly, suggestive of small chronic cortical infarction, and precocious microangiopathic changes  · MRI cervical spine:  · Diffuse developmental spinal canal stenosis exacerbated by acquired degenerative disc disease  Acquired changes are most prominent at C3-4 and C4-5 on the right where there are disc osteophyte complexes resulting in compression of the exiting nerves  Correlate for right C4 and C5 radiculopathy  · Right greater than left endplate and uncinate joint hypertrophic changes at C5-6 and similar findings, left greater than right at C7-T1 with moderate foraminal narrowing  · Multifocal abnormal hyperintense signal noted within the cord  Within the mid cervical cord this is primarily paramedian in location and suggestive of chronic myelomalacia  There is also abnormal signal noted within the upper thoracic CORD seen on sagittal imaging only which appears more central in location, possibly representing syrinx  · No abnormal enhancement identified  · Echo: EF 65%, normal wall motion, normal atria bilaterally   · , Cholesterol 195, A1C 6 3    Etiology for transient paresthesias in the right LE and left fingers remains unclear    LE symptoms unlikely to be related to c-spine findings, however the tingling in the left fingers may be due to the C7-T1 foraminal narrowing  This presentation would be atypical for TIA  Will check MRI thoracic and lumbar spine for possible cord pathology  Plan:  - Neurosurgery consulted due to several abnormalities seen on MRI c-spine  - MRI T-spine and L-spine w/wo contrast pending  - Continue home aspirin 81 mg daily  - Start taking Lipitor 40 mg QHS  - Normotensive, euglycemic, normothermic goal  - PT/OT  - Frequent neuro checks  Continue to monitor and notify neurology with any changes     - Medical management and supportive care per primary team  Correction of any metabolic or infectious disturbances

## 2023-03-27 NOTE — TELEMEDICINE
"e-Consult (IPC)     Inpatient consult to Neurosurgery  Consult performed by: Saeed Garcia PA-C  Consult ordered by: Monroe Chaves PA-C           Contacted by Monroe Hutchins Joseph 62 y o  male MRN: [id number]  Unit/Bed#: E4 -01 Encounter: [id number]    Reason for Consult    Per provider report, patient presents with R foot numbness that progressed and ascended some of the right leg as well as L fingertip numbness  He was admitted and transferred for neurology evaluation and placed on stroke pathway  Brain imaging without acute pathology  H/o CVA with residual facial palsy  MRI cervical spine showed spinal stenosis with right sided foraminal narrowing and myelomalacia without severe central stenosis  Available past medical history,social history, surgical history, medication list, drug allergies and review of systems were reviewed  /79 (BP Location: Right arm)   Pulse 61   Temp (!) 97 2 °F (36 2 °C) (Temporal)   Resp 18   Ht 6' 5\" (1 956 m)   Wt (!) 140 kg (308 lb)   SpO2 92%   BMI 36 52 kg/m²      Clinical exam per SLIM and neurology documentation, isolated subjective sensory changes  No reported weakness  No gait instability  No bowel or bladder dysfunction  Sensation to light touch intact  DTR's intact in uppers and slightly diminished in LE's  No clonus  Imaging personally reviewed  MRI cervical spine showed developmental spinal canal stenosis exacerbated by degenerative disc disease  Changes most prominent at C3-4 and C4-5 on the right compressing the exiting nerve roots  Right greater than left endplate hypertrophic changes at C5-6 and left greater than right C7-T1 foraminal narrowing  No severe central canal stenosis identified  Abnormal hyperintense cord signal suggesting chronic myelomalacia and questionable signal abnormality within the upper thoracic cord possibly representing syrinx given more central location      Assessment and " Recommendations    1  Continue to monitor neurologic symptoms  2  MRI imaging does demonstrate moderate spinal stenosis with chronic myomalacia however patient is grossly asymptomatic aside from subjective sensory changes  No objective findings on exam to correlate  3   Given grossly asymptomatic nature, no indication for transfer for neurosurgical evaluation at this time  4   MRI thoracic and lumbar spine ordered per neurology  Can review imaging when completed  5  If remainder of imaging without significant abnormality, patient can likely follow up outpatient with neurosurgery  6  Contact with further questions, concerns or neurological exam changes  All questions answered  Provider is in agreement with the course of action  11-20 minutes, >50% of the total time devoted to medical consultative verbal/EMR discussion between providers  Written report will be generated in the EMR

## 2023-03-27 NOTE — PROGRESS NOTES
[facility]  Progress Note  Name: Rosenda Lake  MRN: [id number]  Unit/Bed#: E4 -01 I Date of Admission: 3/26/2023   Date of Service: 3/27/2023 I Hospital Day: 1    Assessment/Plan   * Stroke-like symptoms  Assessment & Plan  This is a 58-year old male with history of hypertension, diabetes mellitus, CVA with residual right facial palsy presented intially to [address] for right foot numbness started 6pm last night which progressed up to knee and left fingertip tingling  Denies any extremity weakness, bowel or urinary incontinence, chest pain, dyspnea  Denies any fever or chills  · Admitted under stroke pathway  · CT negative for any acute abnormality  · CTA negative or high-grade stenosis  · Neurochecks, telemetry monitoring    · MRI brain: Scattered faint lesions, perhaps precocious microangiopathic in the setting of CVA risk factors, other postinfectious, postinflammatory, or demyelinating process not excluded  Small area of cortical gliosis in left frontal lobe anteriorly mostly just of of small chronic cortical infarct in this region  No acute infarct, intracranial hemorrhage, or mass effect    · MRI C-spine with and without contrast: Spinal canal stenosis exacerbated by acquired disc degenerative disease  Acquired changes most prominent at C3-C4 and C4-C5 on the right where there are disc osteophyte complexes resulting in compression of exiting nerves  Correlate for right C4 and C5 radiculopathy  C7-T1 with moderate foraminal narrowing  Multifocal abnormal hyperintense signal noted through within the cord possibly chronic myelomalacia    Abnormal signal noted within upper thoracic cord seen on sagittal imaging only which appears more central in location possibly representing syrinx    · Will consult neurosurgery- tiger texted  · MRI T and L spine going to be ordered as well    Type 2 diabetes mellitus [facility])  Assessment & Plan  Lab Results   Component Value Date HGBA1C 6 3 (H) 2023     Recent Labs     23  1537 23  1539 23  2044 23  0825   POCGLU 83 76 88 93   · Currently not any medications as an outpt  · HbA1C is 6 3   · Blood sugars appearing controlled here    Sleep apnea  Assessment & Plan  · Noncompliant with CPAP as an outpt    Essential hypertension  Assessment & Plan  · Not on any medications prior to arrival  · BPs running in the 150s here  · Await neurology recommendations        VTE Pharmacologic Prophylaxis:   Pharmacologic: Enoxaparin (Lovenox)  Mechanical VTE Prophylaxis in Place: Yes    Discharge Plan: Pending neurosurgery recommendations on further imaging    Discussions with Specialists or Other Care Team Provider: Nursing, Dr Aleksey Lakhani    Education and Discussions with Family / Patient: Patient    Time Spent for Care: 45 minutes  More than 50% of total time spent on counseling and coordination of care as described above  Current Length of Stay: 1 day(s)  Current Patient Status: Inpatient   Code Status: Level 1 - Full Code    Subjective:   Resting in chair at bedside  He reports persistent tingling in his right fingertips and hand as well as right foot  His foot has improved since yesterday but still some abnormal sensation  He is very worried about his job and does not want to lose it  He was attempting to file for some type of leave  And I informed him that I am able to give him a work note as well  Objective:     Vitals:   Temp (24hrs), Av 7 °F (35 9 °C), Min:95 6 °F (35 3 °C), Max:98 4 °F (36 9 °C)    Temp:  [95 6 °F (35 3 °C)-98 4 °F (36 9 °C)] 97 2 °F (36 2 °C)  HR:  [61-71] 61  Resp:  [17-20] 18  BP: (123-164)/() 123/79  SpO2:  [92 %-99 %] 92 %  Body mass index is 36 52 kg/m²  Input and Output Summary (last 24 hours):     No intake or output data in the 24 hours ending 23 1206    Physical Exam:     Physical Exam  Vitals and nursing note reviewed     Constitutional:       General: He is not in acute distress  Appearance: Normal appearance  He is normal weight  He is not ill-appearing, toxic-appearing or diaphoretic  HENT:      Head: Normocephalic and atraumatic  Eyes:      General: No scleral icterus  Cardiovascular:      Rate and Rhythm: Normal rate and regular rhythm  Abdominal:      General: Bowel sounds are normal  There is no distension  Palpations: Abdomen is soft  There is no mass  Tenderness: There is no abdominal tenderness  Hernia: No hernia is present  Musculoskeletal:         General: No swelling  Cervical back: Neck supple  Skin:     General: Skin is warm and dry  Neurological:      Mental Status: He is alert  Sensory: Sensory deficit present  Motor: No weakness  Comments: Equal upper motor and lower motor strength bilaterally  Fine motor coordination skills finger to thumb and heel-to-shin intact  No pronator drift  Psychiatric:         Mood and Affect: Mood normal          Behavior: Behavior normal          Additional Data:     Labs:    Results from last 7 days   Lab Units 03/27/23  0602   WBC Thousand/uL 7 68   HEMOGLOBIN g/dL 13 3   HEMATOCRIT % 41 1   PLATELETS Thousands/uL 231   NEUTROS PCT % 58   LYMPHS PCT % 32   MONOS PCT % 8   EOS PCT % 2     Results from last 7 days   Lab Units 03/27/23  0602   POTASSIUM mmol/L 4 0   CHLORIDE mmol/L 107   CO2 mmol/L 27   BUN mg/dL 14   CREATININE mg/dL 0 87   CALCIUM mg/dL 8 4   ALK PHOS U/L 53   ALT U/L 11   AST U/L 15     Results from last 7 days   Lab Units 03/26/23  0639   INR  1 00       * I Have Reviewed All Lab Data Listed Above  * Additional Pertinent Lab Tests Reviewed:  [facility] 66 Admission Reviewed    Imaging:    Imaging Reports Reviewed Today Include: CT head, MRI brain, MRI cervical spine  Imaging Personally Reviewed by Myself Includes:      Recent Cultures (last 7 days):           Last 24 Hours Medication List:   Current Facility-Administered Medications Medication Dose Route Frequency Provider Last Rate   • acetaminophen  650 mg Oral Q6H PRN Melody Renteria MD     • aspirin  325 mg Oral Daily Melody Renteria MD     • atorvastatin  40 mg Oral QPM Melody Renteria MD     • enoxaparin  30 mg Subcutaneous Q12H Michelle Amor MD     • insulin lispro  1-5 Units Subcutaneous TID AC Melody Renteria MD     • nicotine  1 patch Transdermal Daily Melody Renteria MD          Today, Patient Was Seen By: Kenia Rhodes PA-C    ** Please Note: This note has been constructed using a voice recognition system   **

## 2023-03-27 NOTE — UTILIZATION REVIEW
Initial Clinical Review    Admission: Date/Time/Statement:   Admission Orders (From admission, onward)     Ordered        03/26/23 1120  Inpatient Admission  Once                      Orders Placed This Encounter   Procedures   • Inpatient Admission     Standing Status:   Standing     Number of Occurrences:   1     Order Specific Question:   Level of Care     Answer:   Med Surg [16]     Order Specific Question:   Estimated length of stay     Answer:   More than 2 Midnights     Order Specific Question:   Certification     Answer:   I certify that inpatient services are medically necessary for this patient for a duration of greater than two midnights  See H&P and MD Progress Notes for additional information about the patient's course of treatment  Initial Presentation: 62 y o  male presents as a transfer from ED [facility] to [address] [place] [address] for ongoing treatment of stroke-like symptoms - R foot numbness to R knee, L fingertip tingling  PMH: HTN, DM, CVA w/ residual R facial palsy, sleep apnea - noncompliant with CPAP  Initial imaging negative for new CVA  Labs   On exam R chronic paralysis chronic motor strength 5 out of 5 in all extremities, decree sensation in right lower extremity and numbness and left fingertips  He is admitted to INPATIENT status with Stroke-like symptoms - MRI Brain, stroke pathway, tele, neuro checks, TTe, ASA, statin, neuro consult, therapy evals  Date: 3/27   Day 2: Will get MRI T and L spines, neurosurgery consult  Blood sugars controlled  On exam persistent tingling R fingertips and R foot although foot has some improvement since admission  Therapy notes no rehab needs  3/27 Neuro Consult - Paresthesias of the right leg and left fingers - NIHSS 2, imaging unremarkable  Etiology for tranisent paresthesia is unclear  Unlike r/t C spine findings, atypical for TIA, check MRI T and L spines for poss cord pathrology    Continue ASA, Lipisot, therapy evals, freq neuro checks  3/27 Neurosurgery Consult - R foot numbness progress and ascended to RL leg and L fingertips  On stroke pathway  MRI - moderate spinal stenosis with chronic myomalacia however patient is grossly asymptomatic aside from subjective sensory changes  No objective findings on exam to correlate  No severe central canal stenosis identified  Abnormal hyperintense cord signal suggesting chronic myelomalacia and questionable signal abnormality within the upper thoracic cord possibly representing syrinx given more central location  No need for transfer to higher level of care  Waiting MRI R and L spine  Can f/u OP with neurosurgery if imaging is negative        Wt Readings from Last 1 Encounters:   03/26/23 (!) 140 kg (308 lb)     Additional Vital Signs:   03/27/23 1100 97 2 °F (36 2 °C) Abnormal  61 18 123/79 -- 92 % None (Room air) Sitting   03/27/23 0826 97 °F (36 1 °C) Abnormal  66 17 144/94 -- 98 % None (Room air) Sitting   03/27/23 0730 -- -- -- -- -- -- None (Room air) --   03/27/23 0700 97 °F (36 1 °C) Abnormal  66 17 144/94 -- 98 % None (Room air) Sitting   03/27/23 0500 95 6 °F (35 3 °C) Abnormal  62 18 155/90 105 99 % None (Room air) Lying   03/27/23 0300 97 3 °F (36 3 °C) Abnormal  67 18 153/101 Abnormal  118 99 % None (Room air) Lying   03/27/23 0100 96 9 °F (36 1 °C) Abnormal  71 20 155/92 106 99 % None (Room air) Lying   03/26/23 2322 96 1 °F (35 6 °C) Abnormal  69 20 151/100 114 98 % None (Room air) Lying   03/26/23 2200 96 4 °F (35 8 °C) Abnormal  64 20 151/94 106 98 % None (Room air) Lying   03/26/23 2100 96 °F (35 6 °C) Abnormal  65 20 164/104 Abnormal  120 99 % None (Room air) Lying   03/26/23 2000 96 1 °F (35 6 °C) Abnormal  67 20 158/101 Abnormal  119 98 % None (Room air) Lying   03/26/23 1533 98 4 °F (36 9 °C) 67 20 162/99 115 98 % None (Room air) Lying   03/26/23 1452 -- 70 -- 143/93 -- -- -- --   03/26/23 1146 97 3 °F (36 3 °C) Abnormal  68 20 143/93 102 97 % None (Room air) Sitting       Pertinent Labs/Diagnostic Test Results:     3/26 ECG - Normal sinus rhythm  Nonspecific T wave abnormality  Abnormal ECG    3/26 Echo - •  Left Ventricle: Left ventricular cavity size is normal  Wall thickness is increased  There is mild concentric hypertrophy  The left ventricular ejection fraction is 65%  Systolic function is normal  Wall motion is normal  Diastolic function is normal   •  Right Ventricle: Right ventricular cavity size is normal  Systolic function is normal     3/27 MRI T and L spines - pending    MRI cervical spine w wo contrast   Final Result by Alejo Panchal DO (03/27 5090)      Diffuse developmental spinal canal stenosis exacerbated by acquired degenerative disc disease  Acquired changes are most prominent at C3-4 and C4-5 on the right where there are disc osteophyte complexes resulting in compression of the exiting nerves  Correlate for right C4 and C5 radiculopathy  Right greater than left endplate and uncinate joint hypertrophic changes at C5-6 and similar findings, left greater than right at C7-T1 with moderate foraminal narrowing  Multifocal abnormal hyperintense signal noted within the cord  Within the mid cervical cord this is primarily paramedian in location and suggestive of chronic myelomalacia  There is also abnormal signal noted within the upper thoracic CORD seen on    sagittal imaging only which appears more central in location, possibly representing syrinx  No abnormal enhancement identified  MRI brain wo contrast   Final Result by Valentina Whitman MD (03/26 2127)         1  A few scattered white matter lesions, perhaps precocious microangiopathy especially if there are cerebrovascular risk factors  Other postinfectious, postinflammatory or demyelinating processes not excluded  2   Small area of cortical gliosis in the left frontal lobe anteriorly most suggestive of small chronic cortical infarction in this region     3  No acute infarction, intracranial hemorrhage or mass effect  Results from last 7 days   Lab Units 03/26/23  1305   SARS-COV-2  Negative     Results from last 7 days   Lab Units 03/27/23  0602 03/26/23  0639   WBC Thousand/uL 7 68 8 70   HEMOGLOBIN g/dL 13 3 13 9   HEMATOCRIT % 41 1 42 6   PLATELETS Thousands/uL 231 250   NEUTROS ABS Thousands/µL 4 45  --          Results from last 7 days   Lab Units 03/27/23  0602 03/26/23  0639   SODIUM mmol/L 138 140   POTASSIUM mmol/L 4 0 3 9   CHLORIDE mmol/L 107 108   CO2 mmol/L 27 26   ANION GAP mmol/L 4 6   BUN mg/dL 14 15   CREATININE mg/dL 0 87 0 88   EGFR ml/min/1 73sq m 95 95   CALCIUM mg/dL 8 4 9 0     Results from last 7 days   Lab Units 03/27/23  0602 03/26/23  0639   AST U/L 15 20   ALT U/L 11 13   ALK PHOS U/L 53 64   TOTAL PROTEIN g/dL 6 2* 6 7   ALBUMIN g/dL 3 7 4 1   TOTAL BILIRUBIN mg/dL 0 48 0 55   BILIRUBIN DIRECT mg/dL  --  0 09     Results from last 7 days   Lab Units 03/27/23  1117 03/27/23  0825 03/26/23  2044 03/26/23  1539 03/26/23  1537 03/26/23  1153 03/26/23  0659 03/26/23  0642   POC GLUCOSE mg/dl 78 93 88 76 83 90 91 98     Results from last 7 days   Lab Units 03/27/23  0602 03/26/23  0639   GLUCOSE RANDOM mg/dL 96 103         Results from last 7 days   Lab Units 03/26/23  1245 03/26/23  0639   HEMOGLOBIN A1C % 6 3* 6 3*   EAG mg/dl 134 134     Results from last 7 days   Lab Units 03/27/23  0602 03/26/23  0639   CK TOTAL U/L 266 481*     Results from last 7 days   Lab Units 03/26/23  0639   HS TNI 0HR ng/L 6         Results from last 7 days   Lab Units 03/26/23  0639   PROTIME seconds 13 3   INR  1 00   PTT seconds 30     Results from last 7 days   Lab Units 03/26/23  0639   TSH 3RD GENERATON uIU/mL 1  198     Results from last 7 days   Lab Units 03/26/23  1305   INFLUENZA A PCR  Negative   INFLUENZA B PCR  Negative   RSV PCR  Negative         Results from last 7 days   Lab Units 03/26/23  1305   AMPH/METH  Negative   BARBITURATE UR  Negative BENZODIAZEPINE UR  Negative   COCAINE UR  Negative   METHADONE URINE  Negative   OPIATE UR  Negative   PCP UR  Negative   THC UR  Negative     Past Medical History:   Diagnosis Date   • Diabetes mellitus ([facility] [address] )    • Stroke ([facility] [address] )      Present on Admission:  • Essential hypertension  • Sleep apnea  • Stroke-like symptoms  • Type 2 diabetes mellitus (HCC)      Admitting Diagnosis: Stroke-like symptom  Age/Sex: 62 y o  male  Admission Orders:  Scheduled Medications:  aspirin, 81 mg, Oral, Daily  atorvastatin, 40 mg, Oral, QPM  enoxaparin, 30 mg, Subcutaneous, Q12H MATT  insulin lispro, 1-5 Units, Subcutaneous, TID AC  nicotine, 1 patch, Transdermal, Daily      Continuous IV Infusions:     PRN Meds:  acetaminophen, 650 mg, Oral, Q6H PRN - x 1 3/26, 3/27    NIHSS  Neuro checks Every 1 hour x 4 hours, then every 2 hours x 4, then every 4 hours x 72 hours                 MRI Brain  Tele  POC GLUCOSE AC/HS WITH SSI COVERAGE   IP CONSULT TO NEUROLOGY  IP CONSULT TO CASE MANAGEMENT  IP CONSULT TO NUTRITION SERVICES  IP CONSULT TO NEUROSURGERY    Network Utilization Review Department  ATTENTION: Please call with any questions or concerns to [phone] and carefully listen to the prompts so that you are directed to the right person  All voicemails are confidential   Chito Ulloa all requests for admission clinical reviews, approved or denied determinations and any other requests to dedicated fax number below belonging to the campus where the patient is receiving treatment   List of dedicated fax numbers for the Facilities:  [address] DENIALS (Administrative/Medical Necessity) [phone]   [address] (Maternity/NICU/Pediatrics) [phone]   [facility] Crystal Burleson [address] [place], [place] [address] [address] [place] [id number] [address] [facility] [address] [facility] 134 815 [facility] [phone]

## 2023-03-27 NOTE — SPEECH THERAPY NOTE
Speech Language/Pathology  Speech-Language Pathology Bedside Swallow Evaluation        Patient Name: Ezra Foy    LYAUV'Y Date: 3/27/2023     Problem List  Principal Problem:    Stroke-like symptoms  Active Problems:    Essential hypertension    Sleep apnea    Type 2 diabetes mellitus ([facility] [address] )         Past Medical History  Past Medical History:   Diagnosis Date   • Diabetes mellitus ([facility] [address] )    • Stroke [facility])        Past Surgical History  Past Surgical History:   Procedure Laterality Date   • HIP SURGERY     • JOINT REPLACEMENT Left     hip       Summary    Pt presents with oropharyngeal swallow that appears WNL  There were no overt s/s of aspiration  Assessment was somewhat limited; pt was distracted and wanted to call work, therefore he was only willing to eat a few bites of breakfast  Pt has residual R side labial weakness/droop from previous stroke many years ago  He denies any dysphagia, and reports that he eats a regular diet at home  Speech/language skills appear WNL during conversation  MRI does not show any acute change  At this time pt appears appropriate to continue regular diet and thin liquids  Risk of aspiration appears low  No further speech intervention indicated at this time  Recommendations:   Diet: regular diet and thin liquids   Meds: whole with liquid   Frequent Oral care: 2x/day  Independent for feeding  Aspiration precautions and compensatory swallowing strategies: upright posture  Other Recommendations/ considerations: No further ST indicated at this time  If status changes, please send a new order  Current Medical Status  Copied from admission/physician notes:  Ezra Foy is a 62 y o  male who presents with right foot numbness  Patient has history hypertension, diabetes mellitus, CVA with residual right facial palsy presented intially to [facility] for right foot numbness started 6pm last night which progressed up to knee and left fingertip tingling   Denies any extremity "weakness, bowel or urinary incontinence, chest pain, dyspnea  Denies any fever or chills     Order received as part of stroke protocol, and chart reviewed  Discussed with nurse, who did not receive any reports of dysphagia  Pt denies dysphagia  He has a hx of CVA 18 years ago, with residual R facial/labial weakness  Past medical history:   Please see H&P for details      Special Studies:  CT head-  No acute intracranial CT abnormality     CTA head/neck-  No acute intracranial CT abnormality       MRI brain-    1  A few scattered white matter lesions, perhaps precocious microangiopathy especially if there are cerebrovascular risk factors  Other postinfectious, postinflammatory or demyelinating processes not excluded  2   Small area of cortical gliosis in the left frontal lobe anteriorly most suggestive of small chronic cortical infarction in this region  3   No acute infarction, intracranial hemorrhage or mass effect  MRI cervical spine-  Diffuse developmental spinal canal stenosis exacerbated by acquired degenerative disc disease  Acquired changes are most prominent at C3-4 and C4-5 on the right where there are disc osteophyte complexes resulting in compression of the exiting nerves  Correlate for right C4 and C5 radiculopathy  Right greater than left endplate and uncinate joint hypertrophic changes at C5-6 and similar findings, left greater than right at C7-T1 with moderate foraminal narrowing  Multifocal abnormal hyperintense signal noted within the cord  Within the mid cervical cord this is primarily paramedian in location and suggestive of chronic myelomalacia  There is also abnormal signal noted within the upper thoracic CORD seen on   sagittal imaging only which appears more central in location, possibly representing syrinx  No abnormal enhancement identified       This examination was marked \"significant notification\" in Epic in order to begin the standard process by which the " radiology reading room liaison alerts the referring practitioner  CXR-  No acute pulmonary pathology  Social/Education/Vocational Hx:  Pt lives with family    Swallow Information   Current Risks for Dysphagia & Aspiration: hx of CVA, new sx of CVA, chronic R side facial/labial weakness  Current Symptoms/Concerns: None currently reported  Current Diet: regular diet and thin liquids   Baseline Diet: regular diet and thin liquids    Baseline Assessment   Behavior/Cognition: alert  Speech/Language Status: able to participate in conversation and able to follow commands  Patient Positioning: upright in chair     Swallow Mechanism Exam   Facial: right facial droop  Labial: decreased ROM right side  Lingual: WFL  Velum: symmetrical  Mandible: adequate ROM  Dentition: adequate  Vocal quality:clear/adequate   Volitional Cough: strong/productive   Respiratory: room air      Consistencies Assessed and Performance   Consistencies Administered: thin liquids, soft solids and meds whole with water  -scrambled eggs, thin liquids via straw    Oral Stage: adequate bolus retrieval and draw from straw, prompt mastication, bolus formation and transfer, no pocketing or residue  Good lip seal, with no anterior spill from weak side  Pharyngeal Stage: Hyolaryngeal elevation observed and palpated  Swallows appeared prompt  There were no overt s/s of aspiration, including with consecutive straws sips         Esophageal Concerns: none reported      Results Reviewed with: patient and RN   Dysphagia Goals: none at this time  Discharge recommendation: no follow up needed

## 2023-03-27 NOTE — ASSESSMENT & PLAN NOTE
This is a 58-year old male with history of hypertension, diabetes mellitus, CVA with residual right facial palsy presented intially to [facility] for right foot numbness started 6pm last night which progressed up to knee and left fingertip tingling  Denies any extremity weakness, bowel or urinary incontinence, chest pain, dyspnea  Denies any fever or chills  · Admitted under stroke pathway  · CT negative for any acute abnormality  · CTA negative or high-grade stenosis  · Neurochecks, telemetry monitoring    · MRI brain: Scattered faint lesions, perhaps precocious microangiopathic in the setting of CVA risk factors, other postinfectious, postinflammatory, or demyelinating process not excluded  Small area of cortical gliosis in left frontal lobe anteriorly mostly just of of small chronic cortical infarct in this region  No acute infarct, intracranial hemorrhage, or mass effect    · MRI C-spine with and without contrast: Spinal canal stenosis exacerbated by acquired disc degenerative disease  Acquired changes most prominent at C3-C4 and C4-C5 on the right where there are disc osteophyte complexes resulting in compression of exiting nerves  Correlate for right C4 and C5 radiculopathy  C7-T1 with moderate foraminal narrowing  Multifocal abnormal hyperintense signal noted through within the cord possibly chronic myelomalacia    Abnormal signal noted within upper thoracic cord seen on sagittal imaging only which appears more central in location possibly representing syrinx    · Will consult neurosurgery- tiger texted  · MRI T and L spine going to be ordered as well

## 2023-03-27 NOTE — CONSULTS
Consultation - Neurology   Nury Parsons 62 y o  male MRN: [id number]  Unit/Bed#: E4 -01 Encounter: [id number]      Assessment/Plan     Paresthesias of the right leg and left fingers  Assessment & Plan  62 y o  male with prior stroke with residual right facial weakness, HTN, HLD, DM, MIREILLE, obesity, and bipolar 1 disorder who initially presented to [facility] on 3/26/2023 as a stroke alert for numbness/tingling in the right foot that progressed to the distal right lower extremity and tingling in the left fingers  Upon arrival to the ED, /89  NIHSS 2 per ED notes (chronic right facial weakness and new sensory deficits)  CT head/CTA head/neck unremarkable  Patient was not a candidate for TNK due to being outside the time window  Patient was transferred to [facility] for in person neurology evaluation  · MRI brain negative for acute infarct  Did note small area of cortical gliosis in the left frontal lobe anteriorly, suggestive of small chronic cortical infarction, and precocious microangiopathic changes  · MRI cervical spine:  · Diffuse developmental spinal canal stenosis exacerbated by acquired degenerative disc disease  Acquired changes are most prominent at C3-4 and C4-5 on the right where there are disc osteophyte complexes resulting in compression of the exiting nerves  Correlate for right C4 and C5 radiculopathy  · Right greater than left endplate and uncinate joint hypertrophic changes at C5-6 and similar findings, left greater than right at C7-T1 with moderate foraminal narrowing  · Multifocal abnormal hyperintense signal noted within the cord  Within the mid cervical cord this is primarily paramedian in location and suggestive of chronic myelomalacia  There is also abnormal signal noted within the upper thoracic CORD seen on sagittal imaging only which appears more central in location, possibly representing syrinx  · No abnormal enhancement identified    · Echo: EF 65%, normal wall motion, normal atria bilaterally   · , Cholesterol 195, A1C 6 3    Etiology for transient paresthesias in the right LE and left fingers remains unclear  Unlikely to be related to c-spine findings  This presentation would be atypical for TIA  Will check MRI thoracic and lumbar spine for possible cord pathology  Plan:  - Neurosurgery consulted due to several abnormalities seen on MRI c-spine  - MRI T-spine and L-spine w/wo contrast pending  - Continue home aspirin 81 mg daily  - Start taking Lipitor 40 mg QHS  - Normotensive, euglycemic, normothermic goal  - PT/OT  - Frequent neuro checks  Continue to monitor and notify neurology with any changes  - Medical management and supportive care per primary team  Correction of any metabolic or infectious disturbances    Please refer to attending's attestation for further recommendations  Recommendations for outpatient neurological follow up have yet to be determined  History of Present Illness     Reason for Consult / Principal Problem: right LE paresthesias  Hx and PE limited by: N/A  HPI: Ladan Rinaldi is a 62 y o  male with prior stroke with residual right facial weakness, HTN, HLD, DM, MIREILLE, obesity, and bipolar 1 disorder who initially presented to [facility] on 3/26/2023 as a stroke alert for numbness/tingling in the right foot that progressed to the entire right lower extremity and tingling in the left fingers  History obtained per chart review and patient  Patient was sitting on the couch watching TV a few nights ago when he suddenly developed tingling and numbness in the right foot  The next morning he woke up and the paresthesias progressed to the entire distal right lower extremity up to the knee  He also noted numbness and tingling in the left fingers  Upon arrival to the ED, /89  NIHSS 2 per ED notes (chronic right facial weakness and new sensory deficits)    CT head negative for acute intracranial "abnormalities  CTA head/neck revealed <50% stenosis of bilateral carotid bifurcations, but no LVO or significant stenosis  Patient was not a candidate for TNK due to being outside the time window  Patient was transferred to [facility] for in person neurology evaluation  On exam today, patient is sitting comfortably in bedside chair no acute distress  Symptoms completely resolved last night after receiving Tylenol  Denies any headache, visual disturbances, numbness, tingling, arm/leg weakness, chest pain, shortness of breath, or abdominal pain  Patient takes aspirin 81 mg daily for \"prevention\" as recommended by his fiancée  Patient does not take any other medications, however he is supposed to be taking metformin and Lipitor  Inpatient consult to Neurology  Consult performed by: Jessica Sandhu PA-C  Consult ordered by: Macy Spencer MD          Review of Systems   Neurological: Positive for facial asymmetry (chronic)  Numbness: resolved  All other systems reviewed and are negative  Historical Information   Past Medical History:   Diagnosis Date   • Diabetes mellitus ([facility] [address] )    • Stroke [facility])      Past Surgical History:   Procedure Laterality Date   • HIP SURGERY     • JOINT REPLACEMENT Left     hip     Social History   Social History     Substance and Sexual Activity   Alcohol Use Never     Social History     Substance and Sexual Activity   Drug Use Never     E-Cigarette/Vaping   • E-Cigarette Use Never User      E-Cigarette/Vaping Substances     Social History     Tobacco Use   Smoking Status Every Day   • Packs/day: 0 25   • Types: Cigarettes   Smokeless Tobacco Never     Family History:   Family History   Problem Relation Age of Onset   • Diabetes Mother    • Diabetes Father        Review of previous medical records was completed    Reviewed prior notes, labs, CT head, CTA head and neck    Meds/Allergies   all current active meds have been reviewed, current meds:   Current " "Facility-Administered Medications   Medication Dose Route Frequency   • acetaminophen (TYLENOL) tablet 650 mg  650 mg Oral Q6H PRN   • aspirin tablet 325 mg  325 mg Oral Daily   • atorvastatin (LIPITOR) tablet 40 mg  40 mg Oral QPM   • enoxaparin (LOVENOX) subcutaneous injection 30 mg  30 mg Subcutaneous Q12H MATT   • insulin lispro (HumaLOG) 100 units/mL subcutaneous injection 1-5 Units  1-5 Units Subcutaneous TID AC   • nicotine (NICODERM CQ) 7 mg/24hr TD 24 hr patch 1 patch  1 patch Transdermal Daily    and PTA meds:   None       Allergies   Allergen Reactions   • Charentais Melon (Slovak Melon) Swelling   • Other Swelling   • Prunus Persica Swelling   • Strawberry Extract - Food Allergy Swelling   • Metformin Diarrhea       Objective   Vitals:Blood pressure 144/94, pulse 66, temperature (!) 97 °F (36 1 °C), temperature source Temporal, resp  rate 17, height 6' 5\" (1 956 m), weight (!) 140 kg (308 lb), SpO2 98 %  ,Body mass index is 36 52 kg/m²  No intake or output data in the 24 hours ending 03/27/23 0828    Invasive Devices: Invasive Devices     Peripheral Intravenous Line  Duration           Peripheral IV 03/26/23 Left Antecubital 1 day                Physical Exam  Vitals and nursing note reviewed  Constitutional:       Appearance: Normal appearance  Comments: Patient sitting comfortably in bedside chair in no acute distress   HENT:      Head: Normocephalic and atraumatic  Mouth/Throat:      Mouth: Mucous membranes are moist       Pharynx: Oropharynx is clear  Eyes:      Extraocular Movements: Extraocular movements intact  Conjunctiva/sclera: Conjunctivae normal       Pupils: Pupils are equal, round, and reactive to light  Comments: Right eyelid ptosis   Pulmonary:      Effort: Pulmonary effort is normal    Musculoskeletal:         General: Normal range of motion  Skin:     General: Skin is warm and dry     Neurological:      Mental Status: He is alert and oriented to person, place, " and time  Deep Tendon Reflexes:      Reflex Scores:       Bicep reflexes are 2+ on the right side and 2+ on the left side  Brachioradialis reflexes are 2+ on the right side and 2+ on the left side  Patellar reflexes are 1+ on the right side and 1+ on the left side  Achilles reflexes are 0 on the right side and 0 on the left side  Comments: See full neuro exam below       Neurologic Exam     Mental Status   Oriented to person, place, and time  Patient awake and alert  Oriented to person, place, month, and year  No dysarthria or aphasia  Able to follow simple midline and appendicular commands  Cranial Nerves     CN III, IV, VI   Pupils are equal, round, and reactive to light  Pupils 3 mm, round, reactive to light bilaterally  EOMs intact without nystagmus  No visual field deficits  Facial sensation to light touch intact throughout  Peripheral right facial droop (right ptosis, droop of right side of mouth; chronic)  Tongue midline  Soft palate raises symmetrically  Motor Exam   Muscle bulk: normal  Overall muscle tone: normal  Right arm pronator drift: absent  Left arm pronator drift: absent  5/5 strength in bilateral upper and lower extremities  Sensory Exam   Sensation to light touch, temperature, and vibration intact throughout  Gait, Coordination, and Reflexes     Gait  Gait: (deferred for patient's safety)    Reflexes   Right brachioradialis: 2+  Left brachioradialis: 2+  Right biceps: 2+  Left biceps: 2+  Right patellar: 1+  Left patellar: 1+  Right achilles: 0  Left achilles: 0  Right Mixon: absent  Left Mixon: absent  No ataxia with finger to nose bilaterally  No resting or action tremor  No ankle clonus bilaterally       Lab Results:   I have personally reviewed pertinent reports    , CBC:   Results from last 7 days   Lab Units 03/27/23  0602 03/26/23  0639   WBC Thousand/uL 7 68 8 70   RBC Million/uL 4 56 4 76   HEMOGLOBIN g/dL 13 3 13 9   HEMATOCRIT % 41 1 42 6   MCV fL 90 90   PLATELETS Thousands/uL 231 250   , BMP/CMP:   Results from last 7 days   Lab Units 03/27/23  0602 03/26/23  0639   SODIUM mmol/L 138 140   POTASSIUM mmol/L 4 0 3 9   CHLORIDE mmol/L 107 108   CO2 mmol/L 27 26   BUN mg/dL 14 15   CREATININE mg/dL 0 87 0 88   CALCIUM mg/dL 8 4 9 0   AST U/L 15 20   ALT U/L 11 13   ALK PHOS U/L 53 64   EGFR ml/min/1 73sq m 95 95   , Vitamin B12:   Results from last 7 days   Lab Units 03/26/23  0639   VITAMIN B 12 pg/mL 281   , HgBA1C:   Results from last 7 days   Lab Units 03/26/23  1245 03/26/23  0639   HEMOGLOBIN A1C % 6 3* 6 3*   , TSH:   Results from last 7 days   Lab Units 03/26/23  0639   TSH 3RD GENERATON uIU/mL 1  198   , Coagulation:   Results from last 7 days   Lab Units 03/26/23  0639   INR  1 00   , Lipid Profile:   Results from last 7 days   Lab Units 03/27/23  0602   HDL mg/dL 48   LDL CALC mg/dL 126*   TRIGLYCERIDES mg/dL 106   , Ammonia:   , Urinalysis:       Invalid input(s): URIBILINOGEN, Drug Screen:   Results from last 7 days   Lab Units 03/26/23  1305   BARBITURATE UR  Negative   BENZODIAZEPINE UR  Negative   THC UR  Negative   COCAINE UR  Negative   METHADONE URINE  Negative   OPIATE UR  Negative   PCP UR  Negative   , Medication Drug Levels:       Invalid input(s): CARBAMAZEPINE,  PHENOBARB, LACOSAMIDE, OXCARBAZEPINE  Imaging Studies: I have personally reviewed pertinent reports  and I have personally reviewed pertinent films in PACS  EKG, Pathology, and Other Studies: I have personally reviewed pertinent reports     and I have personally reviewed pertinent films in PACS  VTE Prophylaxis: Sequential compression device (Venodyne)  and Enoxaparin (Lovenox)    Code Status: Level 1 - Full Code  Advance Directive and Living Will:      Power of :    POLST:      I have spent a total time of 75 minutes on 03/27/23 in caring for this patient including Diagnostic results, Patient and family education, Impressions, Counseling / Coordination of care, Documenting in the medical record, Reviewing / ordering tests, medicine, procedures  , Obtaining or reviewing history   and Communicating with other healthcare professionals

## 2023-03-27 NOTE — PHYSICAL THERAPY NOTE
"        PT EVALUATION    Pt  Name: Mary Jane De Paz  Pt  Age: 62 y o  MRN: [id number]  LENGTH OF STAY: 1      Admitting Diagnoses:   Stroke-like symptom    Past Medical History:   Diagnosis Date    Diabetes mellitus ([facility] [address] )     Stroke [facility])        Past Surgical History:   Procedure Laterality Date    HIP SURGERY      JOINT REPLACEMENT Left     hip       Imaging Studies:  MRI cervical spine w wo contrast   Final Result by Alejo Sandra DO (03/27 2249)      Diffuse developmental spinal canal stenosis exacerbated by acquired degenerative disc disease  Acquired changes are most prominent at C3-4 and C4-5 on the right where there are disc osteophyte complexes resulting in compression of the exiting nerves  Correlate for right C4 and C5 radiculopathy  Right greater than left endplate and uncinate joint hypertrophic changes at C5-6 and similar findings, left greater than right at C7-T1 with moderate foraminal narrowing  Multifocal abnormal hyperintense signal noted within the cord  Within the mid cervical cord this is primarily paramedian in location and suggestive of chronic myelomalacia  There is also abnormal signal noted within the upper thoracic CORD seen on    sagittal imaging only which appears more central in location, possibly representing syrinx  No abnormal enhancement identified  This examination was marked \"significant notification\" in Epic in order to begin the standard process by which the radiology reading room liaison alerts the referring practitioner  Workstation performed: [id number]         MRI brain wo contrast   Final Result by Kameron Joyner MD (03/26 2127)         1  A few scattered white matter lesions, perhaps precocious microangiopathy especially if there are cerebrovascular risk factors  Other postinfectious, postinflammatory or demyelinating processes not excluded     2   Small area of cortical gliosis in the left frontal lobe " anteriorly most suggestive of small chronic cortical infarction in this region  3   No acute infarction, intracranial hemorrhage or mass effect  Workstation performed: [id number]         MRI inpatient order    (Results Pending)          03/27/23 0910   PT Last Visit   PT Visit Date 03/27/23   Note Type   Note type Evaluation   Additional Comments D/C PT  Pt sitting in bedside chair pre session   Pain Assessment   Pain Assessment Tool 0-10   Pain Score No Pain   Restrictions/Precautions   Weight Bearing Precautions Per Order No   Home Living   Type of Home Apartment   Home Layout Stairs to enter with rails; Able to live on main level with bedroom/bathroom; Performs ADLs on one level   Bathroom Shower/Tub Walk-in shower   Bathroom Toilet Standard   Bathroom Equipment Grab bars in [address] [facility] Walker;Cane   Additional Comments 6 SHANA w/ rail + 3 FOS to reach apartment  Apartment is 1 level   Prior Function   Level of [place] Independent with ADLs; Independent with functional mobility   Lives With Significant other   Receives Help From [facility], [place]) in the last 6 months 0   Vocational Full time employment   Comments PTA, (I) amb w/ no AD  (+)   LIves w/ fiances and dogs  General   Additional Pertinent History Prior CVA w/ residual R facial droop 1980's  Hx L THR  Recent admit 3/5/23 to [address] [facility] for fever, chills, nausea  Current CT and CTA both 3/26/23 (-) for acute infarction, intracranial hemorrhage or mass effect  MRI brain 3/26/23 (-) for acute abnormalities, MRI c-spine 3/27/23 includes significant for R C3-C4 and C4-C5 stenosis w/ R C4 and C5 radiculopathy (see imaging notes for details)   Known bipolar, anxiety, and depression   Family/Caregiver Present No   Cognition   Overall Cognitive Status WFL   Arousal/Participation Alert   Attention Within functional limits   Orientation Level Oriented X4   Following Commands Follows all commands and directions without difficulty Comments pt pleasant and cooperative   Subjective   Subjective pt agreeable to therapy   RUE Assessment   RUE Assessment   (see OT eval)   LUE Assessment   LUE Assessment   (see OT eval)   RLE Assessment   RLE Assessment WFL  (grossly 4+/5)   LLE Assessment   LLE Assessment WFL  (grossly 4+/5)   Coordination   Movements are Fluid and Coordinated 1   Sensation WFL  (pt reports intermittent peripheral neuropathy in distal B/L feet)   Bed Mobility   Supine to Sit Unable to assess   Additional Comments pt sitting bedside chair pre and post session   Transfers   Sit to Stand 7  Independent   Stand to Sit 7  Independent   Additional Comments no v/c required   Ambulation/Elevation   Gait pattern WNL   Gait Assistance 7  Independent   Assistive Device None   Distance 400'   Stair Management Assistance 7  Independent   Stair Management Technique Alternating pattern; Foreward;Reciprocal   Number of Stairs 4  (4 ascend and descend each)   Ambulation/Elevation Additional Comments During ambulation, no gross LOB and no complaints of dizziness, lightheadedness or SOB  Balance   Static Sitting Normal   Dynamic Sitting Normal   Static Standing Good   Dynamic Standing Fair +   Ambulatory Fair +   Endurance Deficit   Endurance Deficit No   Activity Tolerance   Activity Tolerance Patient tolerated treatment well   Medical Staff Made Aware  [address]   Nurse Made Aware   [facility]   Assessment   Prognosis Excellent   Assessment Pt 62 y o  male w/ known hx CVA w/ residual R facial droop, HTN, DM2, bipolar, anxiety, and depression, presented to [place] [address] on 3/26/2023 w/ R LE numbness and transferred to [facility] on 3/26/23  Pt admitted for Stroke-like symptoms  PT eval and tx order w/ up and OOB as tolerated placed  PTA, pt was independent w/ all functional mobility w/ no AD, has 6 steps + 3 FOS to reach front door, and lives w/ fiance in 1 level apartment   Upon evaluation, pt exhibits PTA baseline bed mobility, transfers, and ambulation as noted in flow sheet  Pt demonstrated independent bed mobility, transfers, ambulation, and stair navigation as noted in flowsheet  Pt was able to ambulate independently using no assistive device  Gait within normal limits  Pt reports gait is at baseline  The patient's AM-PAC Basic Mobility Inpatient Short Form Raw Score is 24  A Raw score of greater than 16 suggests the patient may benefit from discharge to home  Please also refer to the recommendation of the Physical Therapist for safe discharge planning  PT will recommend no rehabilitation needs upon d/c from acute care once medically managed  Education provided to pt regarding importance of PT  Continue to encourage mobilization w/ nursing including OOB for meals as tolerated to prevent further functional decline  Nursing notified  Pt tolerated session well  Pt at end of session, in stable condition, seated in bedside chair with all needs within reach  Co-eval with OT necessary for pt's best interest and medical complexity  Barriers to Discharge None   Goals   Patient Goals to go home   Recommendation   PT Discharge Recommendation No rehabilitation needs   Additional Comments D/C PT   AM-PAC Basic Mobility Inpatient   Turning in Flat Bed Without Bedrails 4   Lying on Back to Sitting on Edge of Flat Bed Without Bedrails 4   Moving Bed to Chair 4   Standing Up From Chair Using Arms 4   Walk in Room 4   Climb 3-5 Stairs With Railing 4   Basic Mobility Inpatient Raw Score 24   Basic Mobility Standardized Score 57 68   Highest Level Of Mobility   JH-HLM Goal 8: Walk 250 feet or more   JH-HLM Achieved 8: Walk 250 feet ot more   End of Consult   Patient Position at End of Consult Bedside chair; All needs within reach   End of Consult Comments pt in stable condition, sitting in bedside chair as requested, w/ all needs within reach   Hx/personal factors: co-morbidities and inaccessible home  Examination: assessed body system, balance, endurance, amb, D/C disposition & fall risk  Clinical: unpredictable (ongoing medical status)  Complexity: high    Tristen Stevens PTS

## 2023-03-28 LAB
GLUCOSE SERPL-MCNC: 110 MG/DL (ref 65–140)
GLUCOSE SERPL-MCNC: 88 MG/DL (ref 65–140)
GLUCOSE SERPL-MCNC: 92 MG/DL (ref 65–140)
GLUCOSE SERPL-MCNC: 93 MG/DL (ref 65–140)

## 2023-03-28 RX ADMIN — ATORVASTATIN CALCIUM 40 MG: 40 TABLET, FILM COATED ORAL at 17:39

## 2023-03-28 RX ADMIN — ASPIRIN 81 MG: 81 TABLET, COATED ORAL at 08:51

## 2023-03-28 RX ADMIN — ENOXAPARIN SODIUM 30 MG: 30 INJECTION SUBCUTANEOUS at 08:51

## 2023-03-28 RX ADMIN — ENOXAPARIN SODIUM 30 MG: 30 INJECTION SUBCUTANEOUS at 21:27

## 2023-03-28 RX ADMIN — AMLODIPINE BESYLATE 2.5 MG: 2.5 TABLET ORAL at 08:51

## 2023-03-28 NOTE — ASSESSMENT & PLAN NOTE
Lab Results   Component Value Date    HGBA1C 6 3 (H) 03/26/2023     Recent Labs     03/27/23  1552 03/27/23 2050 03/28/23  0713 03/28/23  1126   POCGLU 93 94 110 92   · Currently not any medications as an outpt  · HbA1C is 6 3   · Blood sugars appearing controlled here

## 2023-03-28 NOTE — PROGRESS NOTES
[address] [place] [address]  Progress Note  Name: Emily Lee  MRN: [id number]  Unit/Bed#: E4 -01 I Date of Admission: 3/26/2023   Date of Service: 3/28/2023 I Hospital Day: 2    Assessment/Plan   * Stroke-like symptoms  Assessment & Plan  This is a 58-year old male with history of hypertension, diabetes mellitus, CVA with residual right facial palsy presented intially to [facility] for right foot numbness started 6pm last night which progressed up to knee and left fingertip tingling  Denies any extremity weakness, bowel or urinary incontinence, chest pain, dyspnea  Denies any fever or chills  · Admitted under stroke pathway  · CT negative for any acute abnormality  · CTA negative or high-grade stenosis  · Neurochecks, telemetry monitoring     · MRI brain: Scattered faint lesions, perhaps precocious microangiopathic in the setting of CVA risk factors, other postinfectious, postinflammatory, or demyelinating process not excluded  Small area of cortical gliosis in left frontal lobe anteriorly mostly just of of small chronic cortical infarct in this region  No acute infarct, intracranial hemorrhage, or mass effect    · MRI C-spine with and without contrast: Spinal canal stenosis exacerbated by acquired disc degenerative disease  Acquired changes most prominent at C3-C4 and C4-C5 on the right where there are disc osteophyte complexes resulting in compression of exiting nerves  Correlate for right C4 and C5 radiculopathy  C7-T1 with moderate foraminal narrowing  Multifocal abnormal hyperintense signal noted through within the cord possibly chronic myelomalacia    Abnormal signal noted within upper thoracic cord seen on sagittal imaging only which appears more central in location possibly representing syrinx    · Consulted neurosurgery about abnormal imaging above  · Fortunately patient remains asymptomatic despite subjective sensory changes  · No indication for neurosurgical evaluation/transfer at this time  · MRI T and L spine pending -neurosurgery to review upon completion    Type 2 diabetes mellitus [facility])  Assessment & Plan  Lab Results   Component Value Date    HGBA1C 6 3 (H) 2023     Recent Labs     23  1552 23  2050 23  0713 23  1126   POCGLU 93 94 110 92   · Currently not any medications as an outpt  · HbA1C is 6 3   · Blood sugars appearing controlled here    Sleep apnea  Assessment & Plan  · Noncompliant with CPAP as an outpt    Essential hypertension  Assessment & Plan  · Not on any medications prior to arrival  · BPs running in the 150s here  · Started on amlodipine 2 5 mg daily   · Continue to follow blood pressures        VTE Pharmacologic Prophylaxis:   Pharmacologic: Enoxaparin (Lovenox)  Mechanical VTE Prophylaxis in Place: Yes    Discharge Plan: With need for continued inpatient stay for pending MRIs    Discussions with Specialists or Other Care Team Provider: Nursing, Dr Seema Grewal    Education and Discussions with Family / Patient: Patient    Time Spent for Care: 30 minutes  More than 50% of total time spent on counseling and coordination of care as described above  Current Length of Stay: 2 day(s)  Current Patient Status: Inpatient   Code Status: Level 1 - Full Code    Subjective:   Resting on edge of bed  He is upset about having abnormal findings on his MRIs of his spine  Currently awaiting lumbar and thoracic MRI  Anxious about losing his job  Reports he will need a work note upon discharge  Objective:     Vitals:   Temp (24hrs), Av 3 °F (36 3 °C), Min:97 1 °F (36 2 °C), Max:97 6 °F (36 4 °C)    Temp:  [97 1 °F (36 2 °C)-97 6 °F (36 4 °C)] 97 6 °F (36 4 °C)  HR:  [60-74] 68  Resp:  [17-18] 18  BP: (123-160)/(80-98) 142/89  SpO2:  [97 %-98 %] 97 %  Body mass index is 36 52 kg/m²  Input and Output Summary (last 24 hours):        Intake/Output Summary (Last 24 hours) at 3/28/2023 1433  Last data filed at 3/28/2023 1231  Gross per 24 hour   Intake 540 ml   Output --   Net 540 ml       Physical Exam:     Physical Exam  Vitals and nursing note reviewed  Constitutional:       General: He is not in acute distress  Appearance: Normal appearance  He is normal weight  He is not ill-appearing, toxic-appearing or diaphoretic  HENT:      Head: Normocephalic and atraumatic  Eyes:      General: No scleral icterus  Cardiovascular:      Rate and Rhythm: Normal rate and regular rhythm  Pulmonary:      Effort: Pulmonary effort is normal  No respiratory distress  Breath sounds: Normal breath sounds  No stridor  No wheezing or rhonchi  Abdominal:      General: Bowel sounds are normal  There is no distension  Palpations: Abdomen is soft  There is no mass  Tenderness: There is no abdominal tenderness  Hernia: No hernia is present  Neurological:      Mental Status: He is alert and oriented to person, place, and time  Mental status is at baseline  Psychiatric:         Mood and Affect: Mood normal          Behavior: Behavior normal          Additional Data:     Labs:    Results from last 7 days   Lab Units 03/27/23  0602   WBC Thousand/uL 7 68   HEMOGLOBIN g/dL 13 3   HEMATOCRIT % 41 1   PLATELETS Thousands/uL 231   NEUTROS PCT % 58   LYMPHS PCT % 32   MONOS PCT % 8   EOS PCT % 2     Results from last 7 days   Lab Units 03/27/23  0602   POTASSIUM mmol/L 4 0   CHLORIDE mmol/L 107   CO2 mmol/L 27   BUN mg/dL 14   CREATININE mg/dL 0 87   CALCIUM mg/dL 8 4   ALK PHOS U/L 53   ALT U/L 11   AST U/L 15     Results from last 7 days   Lab Units 03/26/23  0639   INR  1 00       * I Have Reviewed All Lab Data Listed Above  * Additional Pertinent Lab Tests Reviewed:  Jessica Latif Admission Reviewed    Imaging:    Imaging Reports Reviewed Today Include:   Imaging Personally Reviewed by Myself Includes:      Recent Cultures (last 7 days):           Last 24 Hours Medication List:   Current Facility-Administered Medications   Medication Dose Route Frequency Provider Last Rate   • acetaminophen  650 mg Oral Q6H PRN Kaila Fajardo MD     • amLODIPine  2 5 mg Oral Daily Orion Ortega DO     • aspirin  81 mg Oral Daily Noe Sotomayor PA-C     • atorvastatin  40 mg Oral QPM Kaila Fajardo MD     • enoxaparin  30 mg Subcutaneous Q12H Gilbert Barraza MD     • insulin lispro  1-5 Units Subcutaneous TID AC Kaila Fajardo MD     • nicotine  1 patch Transdermal Daily Kaila Fajardo MD          Today, Patient Was Seen By: Gale Vickers PA-C    ** Please Note: This note has been constructed using a voice recognition system   **

## 2023-03-28 NOTE — ASSESSMENT & PLAN NOTE
· Not on any medications prior to arrival  · BPs running in the 150s here  · Started on amlodipine 2 5 mg daily   · Continue to follow blood pressures

## 2023-03-28 NOTE — ASSESSMENT & PLAN NOTE
This is a 58-year old male with history of hypertension, diabetes mellitus, CVA with residual right facial palsy presented intially to [facility] for right foot numbness started 6pm last night which progressed up to knee and left fingertip tingling  Denies any extremity weakness, bowel or urinary incontinence, chest pain, dyspnea  Denies any fever or chills  · Admitted under stroke pathway  · CT negative for any acute abnormality  · CTA negative or high-grade stenosis  · Neurochecks, telemetry monitoring     · MRI brain: Scattered faint lesions, perhaps precocious microangiopathic in the setting of CVA risk factors, other postinfectious, postinflammatory, or demyelinating process not excluded  Small area of cortical gliosis in left frontal lobe anteriorly mostly just of of small chronic cortical infarct in this region  No acute infarct, intracranial hemorrhage, or mass effect    · MRI C-spine with and without contrast: Spinal canal stenosis exacerbated by acquired disc degenerative disease  Acquired changes most prominent at C3-C4 and C4-C5 on the right where there are disc osteophyte complexes resulting in compression of exiting nerves  Correlate for right C4 and C5 radiculopathy  C7-T1 with moderate foraminal narrowing  Multifocal abnormal hyperintense signal noted through within the cord possibly chronic myelomalacia    Abnormal signal noted within upper thoracic cord seen on sagittal imaging only which appears more central in location possibly representing syrinx    · Consulted neurosurgery about abnormal imaging above  · Fortunately patient remains asymptomatic despite subjective sensory changes  · No indication for neurosurgical evaluation/transfer at this time  · MRI T and L spine pending -neurosurgery to review upon completion

## 2023-03-28 NOTE — UTILIZATION REVIEW
Continued Stay Review    MRI of lumbar and thoracic spine still pending  Will send results once in  Date: 03/28                         Current Patient Class: IP Current Level of Care: MS    HPI:57 y o  male initially admitted on 03/26      Pertinent Labs/Diagnostic Results:   03/28   MRI Thoracic spine - pending    MRI Lumbar spine - pending    Discharge Plan: TBD    Network Utilization Review Department  ATTENTION: Please call with any questions or concerns to [phone] and carefully listen to the prompts so that you are directed to the right person  All voicemails are confidential   Mary Bai all requests for admission clinical reviews, approved or denied determinations and any other requests to dedicated fax number below belonging to the campus where the patient is receiving treatment   List of dedicated fax numbers for the Facilities:  [address] DENIALS (Administrative/Medical Necessity) [phone]   [address] (Maternity/NICU/Pediatrics) [phone]   [address] [phone]   [facility] 77 [phone]   [address] [address] Medical [place] [address] [place] [address] Jose Miguel [address] [address] [facility] 28 [phone]   [address] [facility] 134 815 [facility] [phone]

## 2023-03-29 ENCOUNTER — APPOINTMENT (OUTPATIENT)
Dept: MRI IMAGING | Facility: HOSPITAL | Age: 58
End: 2023-03-29

## 2023-03-29 VITALS
SYSTOLIC BLOOD PRESSURE: 129 MMHG | RESPIRATION RATE: 18 BRPM | WEIGHT: 308 LBS | HEART RATE: 74 BPM | DIASTOLIC BLOOD PRESSURE: 83 MMHG | BODY MASS INDEX: 36.37 KG/M2 | OXYGEN SATURATION: 98 % | HEIGHT: 77 IN | TEMPERATURE: 97.7 F

## 2023-03-29 LAB
GLUCOSE SERPL-MCNC: 91 MG/DL (ref 65–140)
GLUCOSE SERPL-MCNC: 98 MG/DL (ref 65–140)
VIT B1 BLD-SCNC: 128.1 NMOL/L (ref 66.5–200)

## 2023-03-29 RX ORDER — AMLODIPINE BESYLATE 2.5 MG/1
2.5 TABLET ORAL DAILY
Qty: 30 TABLET | Refills: 0 | Status: SHIPPED | OUTPATIENT
Start: 2023-03-30 | End: 2023-04-29

## 2023-03-29 RX ORDER — ATORVASTATIN CALCIUM 40 MG/1
40 TABLET, FILM COATED ORAL EVERY EVENING
Qty: 30 TABLET | Refills: 0 | Status: SHIPPED | OUTPATIENT
Start: 2023-03-29 | End: 2023-04-28

## 2023-03-29 RX ORDER — ASPIRIN 81 MG/1
81 TABLET ORAL DAILY
Qty: 30 TABLET | Refills: 0 | Status: SHIPPED | OUTPATIENT
Start: 2023-03-30 | End: 2023-04-29

## 2023-03-29 RX ADMIN — AMLODIPINE BESYLATE 2.5 MG: 2.5 TABLET ORAL at 09:39

## 2023-03-29 RX ADMIN — GADOBUTROL 14 ML: 604.72 INJECTION INTRAVENOUS at 07:57

## 2023-03-29 RX ADMIN — ASPIRIN 81 MG: 81 TABLET, COATED ORAL at 09:39

## 2023-03-29 NOTE — TELEMEDICINE
"e-Consult (IPC)       Brayan Urias 62 y o  male MRN: [id number]  Unit/Bed#: E4 -01 Encounter: [id number]    Reason for Consult    Per provider report, patient presents with R foot numbness that progressed and ascended some of the right leg as well as L fingertip numbness  He was admitted and transferred for neurology evaluation and placed on stroke pathway  Brain imaging without acute pathology  H/o CVA with residual facial palsy  MRI cervical spine showed spinal stenosis with right sided foraminal narrowing and myelomalacia without severe central stenosis  Available past medical history,social history, surgical history, medication list, drug allergies and review of systems were reviewed  /83 (BP Location: Right arm)   Pulse 74   Temp 97 7 °F (36 5 °C) (Temporal)   Resp 18   Ht 6' 5\" (1 956 m)   Wt (!) 140 kg (308 lb)   SpO2 98%   BMI 36 52 kg/m²      Clinical exam per SLIM and neurology documentation, isolated subjective sensory changes  No reported weakness  No gait instability  No bowel or bladder dysfunction  Sensation to light touch intact  DTR's intact in uppers and slightly diminished in LE's  No clonus  Imaging personally reviewed  MRI thoracic and lumbar spine w/o, 3/29/23: Mild spondylosis without cord compression or cord signal abnormality  In the lumbar spine, multilevel moderate to severe foraminal stenosis  Assessment and Recommendations    1  Continue to monitor neurologic symptoms  2  MRI imaging does demonstrate moderate spinal stenosis with chronic myomalacia however patient is grossly asymptomatic aside from subjective sensory changes  No objective findings on exam to correlate  3   Given grossly asymptomatic nature, no indication for transfer for neurosurgical evaluation at this time  4   MRI thoracic and lumbar spine personally reviewed; no findings on MRI thoracic to explain symptoms   There are varying levels of right sided foraminal stenosis in the lumbar " spine which could be contributing  However, would not offer surgery for numbness alone and would recommend maximizing conservative treatment  5  Consider outpatient EEG if no improvement in symptoms through PCP  5  Patient can follow up with neurosurgery on an as needed basis  6  Contact with further questions, concerns or neurological exam changes  All questions answered  Provider is in agreement with the course of action  11-20 minutes, >50% of the total time devoted to medical consultative verbal/EMR discussion between providers  Written report will be generated in the EMR

## 2023-03-29 NOTE — ASSESSMENT & PLAN NOTE
[facility] intially to [facility] foot numbness started 6pm last night which progressed up to knee and left fingertip tingling  Admitted under stroke pathway  · CT negative for any acute abnormality  · CTA negative or high-grade stenosis  · Neurochecks, telemetry monitoring   · MRI brain: Scattered faint lesions, perhaps precocious microangiopathic in the setting of CVA risk factors, other postinfectious, postinflammatory, or demyelinating process not excluded  Small area of cortical gliosis in left frontal lobe anteriorly mostly just of of small chronic cortical infarct in this region  No acute infarct, intracranial hemorrhage, or mass effect    · MRI C-spine with and without contrast: Spinal canal stenosis exacerbated by acquired disc degenerative disease  Acquired changes most prominent at C3-C4 and C4-C5 on the right where there are disc osteophyte complexes resulting in compression of exiting nerves  Correlate for right C4 and C5 radiculopathy  C7-T1 with moderate foraminal narrowing  Multifocal abnormal hyperintense signal noted through within the cord possibly chronic myelomalacia    Abnormal signal noted within upper thoracic cord seen on sagittal imaging only which appears more central in location possibly representing syrinx    · Consulted neurosurgery about abnormal imaging above, cleared to follow up outpatient  · No indication for neurosurgical evaluation/transfer at this time  · MRI T and L spine showing DDD no need for inpatient intervention

## 2023-03-29 NOTE — ASSESSMENT & PLAN NOTE
Lab Results   Component Value Date    HGBA1C 6 3 (H) 03/26/2023     Recent Labs     03/28/23  1547 03/28/23  2108 03/29/23  0832 03/29/23  1105   POCGLU 93 88 91 98   · Currently not any medications as an outpt  · HbA1C is 6 3   · Blood sugars appearing controlled here

## 2023-03-29 NOTE — DISCHARGE SUMMARY
[facility]  Discharge- Ramya Acosta 1965, 62 y o  male MRN: [id number]  Unit/Bed#: E4 -01 Encounter: [id number]  Primary Care Provider: Nimo Vargas DO   Date and time admitted to hospital: 3/26/2023 10:51 AM    * Stroke-like symptoms  Assessment & Plan  Patient intially to [facility] for right foot numbness started 6pm last night which progressed up to knee and left fingertip tingling  Admitted under stroke pathway  · CT negative for any acute abnormality  · CTA negative or high-grade stenosis  · Neurochecks, telemetry monitoring   · MRI brain: Scattered faint lesions, perhaps precocious microangiopathic in the setting of CVA risk factors, other postinfectious, postinflammatory, or demyelinating process not excluded  Small area of cortical gliosis in left frontal lobe anteriorly mostly just of of small chronic cortical infarct in this region  No acute infarct, intracranial hemorrhage, or mass effect    · MRI C-spine with and without contrast: Spinal canal stenosis exacerbated by acquired disc degenerative disease  Acquired changes most prominent at C3-C4 and C4-C5 on the right where there are disc osteophyte complexes resulting in compression of exiting nerves  Correlate for right C4 and C5 radiculopathy  C7-T1 with moderate foraminal narrowing  Multifocal abnormal hyperintense signal noted through within the cord possibly chronic myelomalacia    Abnormal signal noted within upper thoracic cord seen on sagittal imaging only which appears more central in location possibly representing syrinx    · Consulted neurosurgery about abnormal imaging above, cleared to follow up outpatient  · No indication for neurosurgical evaluation/transfer at this time  · MRI T and L spine showing DDD no need for inpatient intervention     Type 2 diabetes mellitus [facility])  Assessment & Plan  Lab Results   Component Value Date    HGBA1C 6 3 (H) 03/26/2023     Recent Labs 03/28/23  1547 03/28/23  2108 03/29/23  0832 03/29/23  1105   POCGLU 93 88 91 98   · Currently not any medications as an outpt  · HbA1C is 6 3   · Blood sugars appearing controlled here    Essential hypertension  Assessment & Plan  · Not on any medications prior to arrival  · BPs running in the 150s here  · continue amlodipine 2 5 mg daily at discharge  · Monitor BP at home    Sleep apnea  Assessment & Plan  · Noncompliant with CPAP as an outpt      Medical Problems     Resolved Problems  Date Reviewed: 3/29/2023   None       Discharging Physician / Practitioner: Kim Cao PA-C  PCP: Chelsea Eugene DO  Admission Date:   Admission Orders (From admission, onward)     Ordered        03/26/23 1120  Inpatient Admission  Once                      Discharge Date: 03/29/23    Consultations During Hospital Stay:  · Neurology, neursurgery    Procedures Performed:   · none    Significant Findings / Test Results:   · CT stroke brain impression  no acute intracranial CT abnormality  · CTA stroke impression patent major vasculature of the [place] without high-grade stenosis  No aneurysm  No hemodynamically significant stenosis or dissection of cervical carotid and vertebral arteries  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  Dental caries  · Mri brain impression a few scattered white matter lesions, perhaps precocious microangiopathy especially if there are cerebrovascular risk factors  Other postinfectious, postinflammatory or demyelinating processes not excluded  Small area of cortical gliosis in the left frontal lobe anteriorly most suggestive of small chronic cortical infarction in this region  No acute infarction, intracranial hemorrhage or mass effect  · MRI cervical spine impression Diffuse developmental spinal canal stenosis exacerbated by acquired degenerative disc disease    Acquired changes are most prominent at C3-4 and C4-5 on the right where there are disc osteophyte complexes resulting in compression of the exiting nerves  Correlate for right C4 and C5 radiculopathy  Right greater than left endplate and uncinate joint hypertrophic changes at C5-6 and similar findings, left greater than right at C7-T1 with moderate foraminal narrowing  Multifocal abnormal hyperintense signal noted within the cord  Within the mid cervical cord this is primarily paramedian in location and suggestive of chronic myelomalacia  There is also abnormal signal noted within the upper thoracic CORD seen on sagittal imaging only which appears more central in location, possibly representing syrinx  No abnormal enhancement identified  · MRI thoracic spine impression Mild spondylosis without cord compression or cord signal abnormality  · MRI lumbar spine impression Degenerative spondylosis with multilevel foraminal stenosis due to facet arthropathy  Incidental Findings:   · none    Test Results Pending at Discharge (will require follow up):   · none     Outpatient Tests Requested:  · none    Complications:  none    Reason for Admission: stroke like symptoms    Hospital Course:   Scar Del Rio is a 62 y o  male patient who originally presented to the hospital on 3/26/2023 due to right foot numbness that progressed up his knee to his left fingertip tingling  He initially presented to [facility] and was transferred to [facility]  He was admitted under stroke pathway  CT head was negative, CTA was negative, MRI brain was negative  MRI c-spine, MRI thoracic spine and MRI lumbar spine showing moderate spinal stenosis  Neurosurgery was consulted and recommended outpatient with neurosurgery  Neurology cleared for discharge with follow up with 2-3 months  Patient's blood pressure was elevated in the 150's and he was started on Norvasc 2 5 mg daily  Patient will continue on lipitor and aspirin at discharge   He will continue to monitor "his blood pressure at home  He will follow up with his family doctor  Patient should return to the hospital if he has one sided weakness, change in speech, change in vision, numbness, tingling, chest pain or SOB  Please see above list of diagnoses and related plan for additional information  Condition at Discharge: stable    Discharge Day Visit / Exam:   Subjective:  Patient was seen laying in bed today  He reports feeling well today  He denies any headache, weakness, tingling, nausea, vomiting, change in speech, change in vision, chest pain, sob, back pain  Vitals: Blood Pressure: 129/83 (03/29/23 0830)  Pulse: 74 (03/29/23 0830)  Temperature: 97 7 °F (36 5 °C) (03/29/23 0830)  Temp Source: Temporal (03/29/23 0830)  Respirations: 18 (03/29/23 0830)  Height: 6' 5\" (195 6 cm) (03/26/23 1452)  Weight - Scale: (!) 140 kg (308 lb) (03/26/23 1452)  SpO2: 98 % (03/29/23 0830)  Exam:   Physical Exam  Vitals and nursing note reviewed  Constitutional:       Appearance: Normal appearance  HENT:      Head: Normocephalic and atraumatic  Eyes:      General: No scleral icterus  Cardiovascular:      Rate and Rhythm: Normal rate and regular rhythm  Pulmonary:      Effort: Pulmonary effort is normal       Breath sounds: Normal breath sounds  No wheezing  Abdominal:      General: Abdomen is flat  Bowel sounds are normal       Palpations: Abdomen is soft  Musculoskeletal:      Left lower leg: No edema  Skin:     General: Skin is warm and dry  Neurological:      Mental Status: He is alert  Psychiatric:         Mood and Affect: Mood normal          Behavior: Behavior normal           Discussion with Family: Updated  (significant other) at bedside  Discharge instructions/Information to patient and family:   See after visit summary for information provided to patient and family        Provisions for Follow-Up Care:  See after visit summary for information related to follow-up care and any " pertinent home health orders  Disposition:   Home    Planned Readmission: none     Discharge Statement:  I spent 60 minutes discharging the patient  This time was spent on the day of discharge  I had direct contact with the patient on the day of discharge  Greater than 50% of the total time was spent examining patient, answering all patient questions, arranging and discussing plan of care with patient as well as directly providing post-discharge instructions  Additional time then spent on discharge activities  Discharge Medications:  See after visit summary for reconciled discharge medications provided to patient and/or family        **Please Note: This note may have been constructed using a voice recognition system**

## 2023-03-29 NOTE — ASSESSMENT & PLAN NOTE
· Not on any medications prior to arrival  · BPs running in the 150s here  · continue amlodipine 2 5 mg daily at discharge  · Monitor BP at home

## 2023-03-29 NOTE — UTILIZATION REVIEW
Notification of Unplanned, Urgent, or   Emergency Inpatient Admission   [address]  Tax ID: [id number]  NPI: [id number]  Place of Service: [facility]  [address]  Admission Level of Care: Inpatient  Place of Service Code: 21     Attending Physician Information  Attending Name and NPI#: Zandra Jackson [[id number]]  Phone: [phone]     Admission Information  Inpatient Admission Date/Time: 3/26/23 10:51 AM  Discharge Date/Time: No discharge date for patient encounter  Admitting Diagnosis Code/Description:  Stroke-like symptom     Utilization Review Contact  [employer] Utilization   Phone: [phone]  Fax: [phone]  Email: [email or web address]  Contact for approvals/pending authorizations, clinical reviews, and discharge  Physician Advisory Services Contact  Medical Necessity Denial & [employer] Discussion  Phone: [phone]  Fax: [phone]  Email: [email or web address]

## 2023-03-29 NOTE — PLAN OF CARE
Problem: SAFETY ADULT  Goal: Patient will remain free of falls  Description: INTERVENTIONS:  - Educate patient/family on patient safety including physical limitations  - Instruct patient to call for assistance with activity   - Consult OT/PT to assist with strengthening/mobility   - Keep Call bell within reach  - Keep bed low and locked with side rails adjusted as appropriate  - Keep care items and personal belongings within reach  - Initiate and maintain comfort rounds  - Make Fall Risk Sign visible to staff  - Offer Toileting every 2 Hours, in advance of need  - Initiate/Maintain bed alarm  - Obtain necessary fall risk management equipment: alarms  - Apply yellow socks and bracelet for high fall risk patients  - Consider moving patient to room near nurses station  Outcome: Completed  Goal: Maintain or return to baseline ADL function  Description: INTERVENTIONS:  -  Assess patient's ability to carry out ADLs; assess patient's baseline for ADL function and identify physical deficits which impact ability to perform ADLs (bathing, care of mouth/teeth, toileting, grooming, dressing, etc )  - Assess/evaluate cause of self-care deficits   - Assess range of motion  - Assess patient's mobility; develop plan if impaired  - Assess patient's need for assistive devices and provide as appropriate  - Encourage maximum independence but intervene and supervise when necessary  - Involve family in performance of ADLs  - Assess for home care needs following discharge   - Consider OT consult to assist with ADL evaluation and planning for discharge  - Provide patient education as appropriate  Outcome: Completed  Goal: Maintains/Returns to pre admission functional level  Description: INTERVENTIONS:  - Perform BMAT or MOVE assessment daily    - Set and communicate daily mobility goal to care team and patient/family/caregiver     - Collaborate with rehabilitation services on mobility goals if consulted  - Perform Range of Motion 3 times a day   - Reposition patient every 2 hours  - Dangle patient 3 times a day  - Stand patient 3 times a day  - Ambulate patient 3 times a day  - Out of bed to chair 3 times a day   - Out of bed for meals 3 times a day  - Out of bed for toileting  - Record patient progress and toleration of activity level   Outcome: Completed     Problem: DISCHARGE PLANNING  Goal: Discharge to home or other facility with appropriate resources  Description: INTERVENTIONS:  - Identify barriers to discharge w/patient and caregiver  - Arrange for needed discharge resources and transportation as appropriate  - Identify discharge learning needs (meds, wound care, etc )  - Arrange for interpretive services to assist at discharge as needed  - Refer to Case Management Department for coordinating discharge planning if the patient needs post-hospital services based on physician/advanced practitioner order or complex needs related to functional status, cognitive ability, or social support system  Outcome: Completed     Problem: Knowledge Deficit  Goal: Patient/family/caregiver demonstrates understanding of disease process, treatment plan, medications, and discharge instructions  Description: Complete learning assessment and assess knowledge base  Interventions:  - Provide teaching at level of understanding  - Provide teaching via preferred learning methods  Outcome: Completed     Problem: Neurological Deficit  Goal: Neurological status is stable or improving  Description: Interventions:  - Monitor and assess patient's level of consciousness, motor function, sensory function, and level of assistance needed for ADLs  - Monitor and report changes from baseline  Collaborate with interdisciplinary team to initiate plan and implement interventions as ordered  - Provide and maintain a safe environment  - Consider seizure precautions  - Consider fall precautions  - Consider aspiration precautions  - Consider bleeding precautions    Outcome: Completed     Problem: Activity Intolerance/Impaired Mobility  Goal: Mobility/activity is maintained at optimum level for patient  Description: Interventions:  - Assess and monitor patient  barriers to mobility and need for assistive/adaptive devices  - Assess patient's emotional response to limitations  - Collaborate with interdisciplinary team and initiate plans and interventions as ordered  - Encourage independent activity per ability   - Maintain proper body alignment  - Perform active/passive rom as tolerated/ordered    - Plan activities to conserve energy   - Turn patient as appropriate  Outcome: Completed

## 2023-03-29 NOTE — UTILIZATION REVIEW
MRI  T/spine    Mild spondylosis without cord compression or cord signal abnormality  MRI L/S  Spine  Degenerative spondylosis with multilevel foraminal stenosis due to facet arthropathy

## 2023-03-29 NOTE — DISCHARGE INSTR - AVS FIRST PAGE
Internal Medicine Discharge Instructions  - start Lipitor 40 mg daily   - start Norvasc 2 5 mg daily   -continue aspirin   -Follow up with neurology outpatient in 2-3 months  -Ambulatory referral to neurosurgery placed

## 2023-03-30 LAB — METHYLMALONATE SERPL-SCNC: 181 NMOL/L (ref 0–378)

## 2023-03-30 NOTE — UTILIZATION REVIEW
NOTIFICATION OF ADMISSION DISCHARGE   This is a Notification of Discharge from [facility]  Please be advised that this patient has been discharge from our facility  Below you will find the admission and discharge date and time including the patient’s disposition  UTILIZATION REVIEW CONTACT:  Dallin Car  Utilization   Network Utilization Review Department  Phone: [phone] x carefully listen to the prompts  All voicemails are confidential   Email: [email or web address]     ADMISSION INFORMATION  PRESENTATION DATE: 3/26/2023 10:51 AM  OBERVATION ADMISSION DATE:  INPATIENT ADMISSION DATE: 3/26/23 10:51 AM   DISCHARGE DATE: 3/29/2023  3:11 PM   DISPOSITION:Home/Self Care    IMPORTANT INFORMATION:  Send all requests for admission clinical reviews, approved or denied determinations and any other requests to dedicated fax number below belonging to the campus where the patient is receiving treatment   List of dedicated fax numbers:  [address] DENIALS (Administrative/Medical Necessity) [phone]   [address] (Maternity/NICU/Pediatrics) [phone]   [facility] [phone]   [facility] 87 [phone]   [address] [phone]   220 [facility] [phone] 90 [facility] [phone]   [address] [place] [address] 119 [phone]   [facility]  [phone]   [facility] [phone]   412 [facility] 850 [facility] [phone]

## 2023-04-06 ENCOUNTER — TELEPHONE (OUTPATIENT)
Dept: NEUROLOGY | Facility: CLINIC | Age: 58
End: 2023-04-06

## 2023-04-06 NOTE — TELEPHONE ENCOUNTER
Staff message sent to inpatient provider for scheduling clarification  Awaiting response for scheduling  HFU/SLA/PARESTHESIAS OF RIGHT LEG AND LEFT FINGERS        NOTES: Patient can follow-up with neurology as outpatient in 2 to 3 months after discharge

## 2023-04-07 NOTE — TELEPHONE ENCOUNTER
1ST ATTEMPT - Called patient and LVM to call back to schedule HFU  Left our number  Notes:  [facility] Link  Cc: Narda Dougherty; Estella Mortensen  Good morning! Patient can follow up with general attending in 2-3 months  Thank you!    Belen Solorio

## 2023-04-07 NOTE — TELEPHONE ENCOUNTER
Pt called back and I advised him of the locations I can schedule him at and he asked if we can help assist with transportation I advised after we schedule I will send a message to our  to get in contact with him  I reached out to [facility] and she advised I can offer patient 5-18-23 at 11 am with Dr Negro Lau in [facility] and I gave that to the patient and he accepted  Sent him an appointment card in the mail today

## 2023-04-07 NOTE — TELEPHONE ENCOUNTER
MSW phoned patient at [phone]  MSW inquired how patient normally gets to his appts  Patient stated that all his appts are typically within walking distance  MSW advised that since he has Medicaid, he may be able to qualify for the [address] in [facility]  Patient stated that he no longer has Medicaid since about October when he started working at [employer]  MSW advised that this writer would run his eligibility for Medicaid before any transportation options could be offered  MSW phoned the [employer] Eligibility Line at [phone]  As per the automated system, patient is eligible for Medicaid as of today's date  MSW attempted to reach [facility] at [phone] to see if they could run eligibility to see if patient has transportation benefits  Their office was closed for the Easter holiday  MSW will try to reach them again on 4/10/23

## 2023-04-10 NOTE — TELEPHONE ENCOUNTER
TRACY phoned Shahid Uribe this date at [phone] and spoke with [place]  She checked patient's eligibility and advised that he would be eligible for [address] transportation  [place] stated that she woud need patient's name/address//SSN to get him in their system and that then they could send him an application out  [employer] [place] [employer] [place] stated that they will also need a copy of the referral and letter faxed to them explaining why patient needs to travel outside of the [facility] for his appt  Fax number is [phone]

## 2023-04-11 NOTE — TELEPHONE ENCOUNTER
MSW phoned patient at [phone] to make him aware that he is still active with [place]  MSW explained that [facility] will need his name//address/SSN to get him in their system and that they will mail him an application  Patient asked this writer to contact [facility] to provide needed information (MSW verified the information with patient)  Patient will watch for the application in the mail  MSW encouraged patient to complete and return the application ASAP so he can be registered in a timely manner  Once patient is officially registered, MSW will write letter requesting out of county transportation to his  neuro appt in [place]  MSW attempted to reach [facility] at [phone]  No answer  MSW left a message requesting callback  Awaiting same

## 2023-04-12 NOTE — TELEPHONE ENCOUNTER
LATE ENTRY FROM 23:    MSW received a callback from [place] at [facility]  Celina took patient's name/address//SSN to input in their system  [employer] [place] [employer] [place] stated that she will mail patient an application which he should complete and return ASAP  [employer] [place] [employer] [place] stated that when they receive the application back, they will re-verify the patient's [place] coverage, and if it is still active, they will register him right away  [employer] [place] [employer] [place] stated that this writer can provide the letter explaining the need for out of county travel at this time, along with a referral for neurology   [employer] [place] [employer] [place] stated that hospital discharge orders indicating need for neurology follow-up is acceptable if there is no actual referral

## 2023-04-14 NOTE — TELEPHONE ENCOUNTER
Letter requesting out of [facility] transportation drafted this date and was sent to Dr Concepcion Jolly for review/signature

## 2023-05-18 ENCOUNTER — OFFICE VISIT (OUTPATIENT)
Dept: NEUROLOGY | Facility: CLINIC | Age: 58
End: 2023-05-18

## 2023-05-18 VITALS
WEIGHT: 306.2 LBS | HEART RATE: 90 BPM | DIASTOLIC BLOOD PRESSURE: 90 MMHG | HEIGHT: 77 IN | SYSTOLIC BLOOD PRESSURE: 150 MMHG | BODY MASS INDEX: 36.15 KG/M2

## 2023-05-18 DIAGNOSIS — R20.0 NUMBNESS AND TINGLING: ICD-10-CM

## 2023-05-18 DIAGNOSIS — R20.2 NUMBNESS AND TINGLING: ICD-10-CM

## 2023-05-18 DIAGNOSIS — R29.90 STROKE-LIKE SYMPTOMS: Primary | ICD-10-CM

## 2023-05-18 RX ORDER — NAPROXEN 500 MG/1
TABLET ORAL
COMMUNITY
Start: 2023-03-18

## 2023-05-18 RX ORDER — UREA 10 %
1 LOTION (ML) TOPICAL DAILY
COMMUNITY

## 2023-05-18 RX ORDER — SILDENAFIL 100 MG/1
TABLET, FILM COATED ORAL
COMMUNITY
Start: 2023-04-12

## 2023-05-18 RX ORDER — ATORVASTATIN CALCIUM 20 MG/1
20 TABLET, FILM COATED ORAL EVERY MORNING
COMMUNITY
Start: 2023-03-18

## 2023-05-18 NOTE — ASSESSMENT & PLAN NOTE
· Numbness of R toes beginning 3/26/23, progressed to entire RLE numbness and L finger tips numbness the following day  Described as tingling/burning  · Initially seen by Nano Stoner on 3/26, transferred to [facility] for further evaluation  Neurosurgery seen, no indications for surgical intervention, conservative management  · Today, symptoms improved  Numbness/tingling mild along L finger tips (all digits except thumb) and R big toe (plantar surface); comes and goes; does not impact daily functioning  · No sensory or motor deficits on examination today  Neuroimaging significant for MRI c-spine showing diffuse developmental spinal canal stenosis exacerbated by acquired degenerative disc disease and L-spine with multilevel foraminal stenosis  Impression: Numbness likely secondary to foraminal stenosis, exacerbated by positioning causing compression on spinal nerves  Plan:  - Given history of prior stroke, we agree with continuing daily ASA 81mg for secondary prevention   - Follow up as needed  Defer medical treatment at this time for numbness; patient agreeable  If numbness/tingling worsens and spreads, recommend reaching out to our office for symptomatic management versus scheduling appointment with neurosurgery for surgical intervention

## 2023-05-18 NOTE — PATIENT INSTRUCTIONS
Given history of prior stroke, we agree with continuing daily ASA 81mg for secondary prevention  Follow up as needed  If numbness/tingling worsens and spreads, recommend reaching out to our office for symptomatic management versus scheduling appointment with neurosurgery for surgical intervention

## 2023-05-18 NOTE — PROGRESS NOTES
Patient ID: Belle Pandey is a 62 y o  male  Assessment/Plan:    Paresthesias of the right leg and left fingers  · Numbness of R toes beginning 3/26/23, progressed to entire RLE numbness and L finger tips numbness the following day  Described as tingling/burning  · Initially seen by Nano Stoner on 3/26, [facility]  Neurosurgery seen, no indications for surgical intervention, conservative management  · Today, symptoms improved  Numbness/tingling mild along L finger tips (all digits except thumb) and R big toe (plantar surface); comes and goes; does not impact daily functioning  · No sensory or motor deficits on examination today  Neuroimaging significant for MRI c-spine showing diffuse developmental spinal canal stenosis exacerbated by acquired degenerative disc disease and L-spine with multilevel foraminal stenosis  Impression: Numbness likely secondary to foraminal stenosis, exacerbated by positioning causing compression on spinal nerves  Plan:  - Given history of prior stroke, we agree with continuing daily ASA 81mg for secondary prevention   - Follow up as needed  Defer medical treatment at this time for numbness; patient agreeable  If numbness/tingling worsens and spreads, recommend reaching out to our office for symptomatic management versus scheduling appointment with neurosurgery for surgical intervention  Diagnoses and all orders for this visit:    Stroke-like symptoms  -     Ambulatory Referral to Neurology    Numbness and tingling  -     Ambulatory Referral to Neurology    Other orders  -     atorvastatin (LIPITOR) 20 mg tablet; Take 20 mg by mouth every morning (Patient not taking: Reported on 5/18/2023)  -     Diclofenac Sodium (VOLTAREN) 1 %; Apply topically 4 (four) times a day (Patient not taking: Reported on 5/18/2023)  -     sildenafil (VIAGRA) 100 mg tablet;  Take 1 tablet daily as needed for Erectile dysfunction 1-4 hours before sexual activity (Patient not taking: Reported on 5/18/2023)  -     Pediatric Multivitamins-Iron (Polyvitamin/Iron) 18 MG CHEW; Chew 1 tablet daily (Patient not taking: Reported on 5/18/2023)  -     naproxen (NAPROSYN) 500 mg tablet; TAKE 1 TABLET BY MOUTH IN THE MORNING AND 1 TABLET IN THE EVENING  WITH FOOD  (Patient not taking: Reported on 5/18/2023)           Subjective:    62YO M with history of DM, stroke (1988, chronic R facial droop), sleep apnea (noncompliant with cpap, sleep has improved with wt loss), intentional 80lb weight loss over 9 months presenting for hospital follow-up for stroke-like symptoms  Seen at [facility] on 3/26/23 for numbness  Reported he began experiencing gradual onset of numbness of R toes, described as tingling and burning that spread to R foot on 3/25/23 at 1800, followed by increased numbness in entire RLE and L finger tips the following morning  Denied headache, slurred speech, focal weakness  Also denied dizziness, fever, n/v  During ED visit, noted to have hyporeflexic patellar reflexes b/l  In ED, /89, afebrile, POC glucose 98  Received ASA 325mg and 1L bolus  BMP and CBCP unremarkable  On-call neurology was contacted and suspected sxs related to other acute neurologic process; not a tPA candidate based on sx onset 12 hours ago  NIHSS 1 for sensory deficits  Transferred to [facility], discharged on 3/29/23  Seen by neurosurgery while inpatient, varying levels of R-sided foraminal stenosis in lumbar spine may be contributing, no indications for surgery at this time, recommended maximize conserve treatment  Neuroimaging significant for MRI c-spine showing diffuse developmental spinal canal stenosis exacerbated by acquired degenerative disc disease and L-spine with multilevel foraminal stenosis  Symptoms still persistent to today  Continues to take ASA 81mg daily OTC  Symptoms on and off   Doesn't affect daily functioning  Less intense today than before  Numbness and tingling in R big toe and L fingertips (all finger tips except thumb), mild  Goes away with shaking hand  WORK-UP:  - EKG, 3/2023: NSR 72  - A1c 6 3%, 3/26/23  -  (3/27/23)    CTA stroke alert H/N, 3/26/23: Findings discussed with Dr Manuel Burger  1  Patent major vasculature of Ruby of lopez without high-grade stenosis  No aneurysm  2  No hemodynamically significant stenosis or dissection of cervical carotid and vertebral arteries  3  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  A  Cahto syndrome refers to a condition characterized by pain in the mouth, throat, head, neck and face  The pain is caused by calcification and hardening of a ligament that connects a pointed piece of bone in the lower part of the skull called the styloid process to the hyoid bone in the neck  4  Dental caries  CT stroke alert brain, 3/26/23: No acute intracranial CT abnormality  MRI brain wo, 3/26/23:   1  Few scattered white matter lesions, perhaps precocious microangiopathy adi if there are cerebrovascular risk factors  Other postinfectious, postinflammatory, or demyelinating processes not excluded  2  Small area of cortical gliosis in the L frontal lobe anteriorly most suggestive of small chronic cortical infarction in this region  3  No acute infarction, intracranial hemorrhage or mass effect   - MRI c-spine w/wo, 3/27/23:   1  Diffuse developmental spinal canal stenosis exacerbated by acquired degenerative disc disease  Acquired changes are most prominent at C3-4 and C4-5 on the R where there are disc osteophyte complexes resulting in compression of the exiting nerves  Correlate for R C4 and C5 radiculopathy  2  R > L endplate and uncinate joint hypertrophic changes at C5-6 and similar findings, L > R at C7-T1 with moderate foraminal narrowing    3  Multifocal abnormal hyperintense signal noted "within the cord  Within the mid cervical cord, this is primarily paramedian in location and suggestive of chronic myelomalacia  Abnormal signal noted within the upper thoracic COR seen on sagittal imaging only which appears more central in location, possibly representing synrix  4  No abnormal enhancement identified  MRI t-spine w/wo, 3/27/23: Mild spondylosis w/o cord compression or cord signal abnormality  MRI L-spine w/wo: Degenerative spondylosis with multilevel foraminal stenosis due to facet arthropathy  The following portions of the patient's history were reviewed and updated as appropriate: allergies, current medications, past family history, past medical history, past social history, past surgical history and problem list          Objective:    Blood pressure 150/90, pulse 90, height 6' 5\" (1 956 m), weight (!) 139 kg (306 lb 3 2 oz)  Physical Exam  Vitals and nursing note reviewed  Constitutional:       General: He is not in acute distress  Appearance: Normal appearance  He is not ill-appearing, toxic-appearing or diaphoretic  HENT:      Head: Normocephalic and atraumatic  Right Ear: External ear normal       Left Ear: External ear normal       Nose: Nose normal       Mouth/Throat:      Mouth: Mucous membranes are moist    Eyes:      General:         Right eye: No discharge  Left eye: No discharge  Extraocular Movements: Extraocular movements intact  Conjunctiva/sclera: Conjunctivae normal       Pupils: Pupils are equal, round, and reactive to light  Cardiovascular:      Rate and Rhythm: Normal rate  Pulmonary:      Effort: Pulmonary effort is normal    Musculoskeletal:         General: Normal range of motion  Cervical back: Normal range of motion  Skin:     General: Skin is warm and dry  Neurological:      Mental Status: He is alert and oriented to person, place, and time  Mental status is at baseline        Cranial Nerves: No cranial nerve " deficit  Sensory: No sensory deficit  Motor: Motor strength is normal  No weakness  Coordination: Coordination normal       Gait: Gait normal       Deep Tendon Reflexes: Reflexes normal       Reflex Scores:       Tricep reflexes are 2+ on the right side and 2+ on the left side  Bicep reflexes are 2+ on the right side and 2+ on the left side  Brachioradialis reflexes are 2+ on the right side and 2+ on the left side  Patellar reflexes are 1+ on the right side and 1+ on the left side  Psychiatric:         Mood and Affect: Mood normal          Speech: Speech normal          Behavior: Behavior normal          Neurological Exam  Mental Status  Alert  Oriented to person, place and time  Oriented to person, place, and time  Speech is normal  Language is fluent with no aphasia  Attention and concentration are normal     Cranial Nerves  CN II: Visual acuity is normal  Visual fields full to confrontation  CN III, IV, VI: Extraocular movements intact bilaterally  Right ptosis  Chronic since 1988 stroke  Pupils equal round and reactive to light bilaterally  CN V: Facial sensation is normal   CN VII:  Right: Chronic since 1988 stroke  Left: There is no facial weakness  CN VIII: Hearing is normal   CN IX, X: Palate elevates symmetrically  Normal gag reflex  CN XI: Shoulder shrug strength is normal   CN XII: Tongue midline without atrophy or fasciculations  Motor  Normal muscle bulk throughout  No fasciculations present  Normal muscle tone  No abnormal involuntary movements  Strength is 5/5 throughout all four extremities  Sensory  Sensation is intact to light touch, pinprick, vibration and proprioception in all four extremities      Reflexes                                            Right                      Left  Brachioradialis                    2+                         2+  Biceps                                 2+                         2+  Triceps 2+                         2+  Patellar                                1+                         1+    Coordination  Right: Finger-to-nose normal Left: Finger-to-nose normal     Gait  Normal casual, toe, heel and tandem gait  Normal gait  ROS:    Review of Systems   Constitutional: Negative  Negative for appetite change and fever  HENT: Negative  Negative for hearing loss, tinnitus, trouble swallowing and voice change  Eyes: Negative  Negative for photophobia, pain and visual disturbance  Respiratory: Negative  Negative for shortness of breath  Cardiovascular: Negative  Negative for palpitations  Gastrointestinal: Negative  Negative for nausea and vomiting  Endocrine: Negative  Negative for cold intolerance  Genitourinary: Negative  Negative for dysuria, frequency and urgency  Musculoskeletal: Negative  Negative for gait problem, myalgias and neck pain  Skin: Negative  Negative for rash  Allergic/Immunologic: Negative  Neurological: Positive for numbness  Negative for dizziness, tremors, seizures, syncope, facial asymmetry, speech difficulty, weakness, light-headedness and headaches  Patient states numbness in R foot and tingling the L foot  Patient sometimes when stands up fast may be dizzy,    Hematological: Negative  Does not bruise/bleed easily  Psychiatric/Behavioral: Negative  Negative for confusion, hallucinations and sleep disturbance

## 2023-06-02 NOTE — H&P
[address] [facility]  H&P  Name: Augustina Friday  MRN: [id number]  Unit/Bed#: E4 -01 I Date of Admission: 3/26/2023   Date of Service: 3/26/2023 I Hospital Day: 0      Assessment/Plan   * Stroke-like symptoms  Assessment & Plan  This is a 58-year old male with history of hypertension, diabetes mellitus, CVA with residual right facial palsy presented intially to [facility] for right foot numbness started 6pm last night which progressed up to knee and left fingertip tingling  Denies any extremity weakness, bowel or urinary incontinence, chest pain, dyspnea  Denies any fever or chills    · CT negative for any acute abnormality  · CTA negative or high-grade stenosis  · Stroke pathway  · Neurochecks, telemetry monitoring  · MRI brain, MRI C-spine with and without contrast  · TTE  · ASA, Statin  · Neurology consultation will be appreciated  · PT/OT/ST    Type 2 diabetes mellitus [facility])  Assessment & Plan  Lab Results   Component Value Date    HGBA1C 6 7 (H) 10/20/2021       Recent Labs     03/26/23  0642 03/26/23  0659 03/26/23  1153   POCGLU 98 91 90     · Currently not any medications  · Will re-check HbA1C  · Monitor accuchecks, sliding scale for coverage    Sleep apnea  Assessment & Plan  · Noncompliant with CPAP    Essential hypertension  Assessment & Plan  · Not on any medications  · Blood pressure controlled, will monitor        VTE Prophylaxis: Enoxaparin (Lovenox)  / sequential compression device   Code Status: Full  POLST: There is no POLST form on file for this patient (pre-hospital)    Anticipated Length of Stay:  Patient will be admitted on an Inpatient basis with an anticipated length of stay of  Greater than 2 midnights  Justification for Hospital Stay: stroke like symptoms    Total Time for Visit, including Counseling / Coordination of Care:Greater than 50% of this total time spent on direct patient counseling and coordination of care      Chief Complaint:   Right foot Try over the counter Omeprazole (20 mg)- this will help with acid reflux, take in the morning an hour prior to eating.    numbness    History of Present Illness:    Kuldip Aly is a 62 y o  male who presents with right foot numbness  Patient has history hypertension, diabetes mellitus, CVA with residual right facial palsy presented intially to [facility] for right foot numbness started 6pm last night which progressed up to knee and left fingertip tingling  Denies any extremity weakness, bowel or urinary incontinence, chest pain, dyspnea  Denies any fever or chills    Review of Systems:    Review of Systems   Constitutional: Negative  HENT: Negative  Eyes: Negative  Respiratory: Negative  Cardiovascular: Negative  Gastrointestinal: Negative  Endocrine: Negative  Genitourinary: Negative  Musculoskeletal: Negative  Skin: Negative  Allergic/Immunologic: Negative  Neurological: Positive for numbness  Hematological: Negative  Psychiatric/Behavioral: Negative  Past Medical and Surgical History:     Past Medical History:   Diagnosis Date   • Diabetes mellitus ([facility] )    • Stroke [facility])        Past Surgical History:   Procedure Laterality Date   • HIP SURGERY     • JOINT REPLACEMENT Left     hip       Meds/Allergies:    Prior to Admission medications    Not on File     I have reviewed home medications with patient personally  Allergies:    Allergies   Allergen Reactions   • Charentais Melon (Setswana Melon) Swelling   • Other Swelling   • Prunus Persica Swelling   • Strawberry Extract - Food Allergy Swelling   • Metformin Diarrhea       Social History:     Social History     Substance and Sexual Activity   Alcohol Use Never     Social History     Tobacco Use   Smoking Status Every Day   • Packs/day: 0 25   • Types: Cigarettes   Smokeless Tobacco Never     Social History     Substance and Sexual Activity   Drug Use Never       Family History:    Family History   Problem Relation Age of Onset   • Diabetes Mother    • Diabetes Father        Physical Exam:     Vitals:   Blood Pressure: 143/93 (03/26/23 1146)  Pulse: 68 (03/26/23 1146)  Temperature: (!) 97 3 °F (36 3 °C) (03/26/23 1146)  Temp Source: Temporal (03/26/23 1146)  Respirations: 20 (03/26/23 1146)  SpO2: 97 % (03/26/23 1146)    Constitutional: Patient is oriented to person, place and time, no acute distress  HEENT:  Normocephalic, atraumatic  Cardiovascular: Normal S1S2, RRR, No murmurs/rubs/gallops appreciated  Pulmonary:  Bilateral air entry, No rhonchi/rales/wheezing appreciated  Abdominal: Soft, Bowel sounds present, Non-tender, Non-distended  Extremities:  No cyanosis, clubbing or edema  Neurological: Right chronic paralysis chronic motor strength 5 out of 5 in all extremities, decree sensation in right lower extremity and numbness and left fingertips    Additional Data:     Lab Results: I have personally reviewed pertinent reports  Results from last 7 days   Lab Units 03/26/23  0639   WBC Thousand/uL 8 70   HEMOGLOBIN g/dL 13 9   HEMATOCRIT % 42 6   PLATELETS Thousands/uL 250     Results from last 7 days   Lab Units 03/26/23  0639   POTASSIUM mmol/L 3 9   CHLORIDE mmol/L 108   CO2 mmol/L 26   BUN mg/dL 15   CREATININE mg/dL 0 88   CALCIUM mg/dL 9 0   ALK PHOS U/L 64   ALT U/L 13   AST U/L 20     Results from last 7 days   Lab Units 03/26/23  0639   INR  1 00       Imaging: I have personally reviewed pertinent reports  XR chest 1 view portable    Result Date: 3/5/2023  Narrative: CHEST INDICATION:   cough  COMPARISON:  Chest radiograph from 2/13/2020  EXAM PERFORMED/VIEWS:  XR CHEST PORTABLE FINDINGS: The lungs are clear  No pleural effusions  No evidence of pneumothorax  Cardiac silhouette not accurately accessed on this projection  Impression: No acute pulmonary pathology  Workstation performed: [id number]     CT stroke alert brain    Result Date: 3/26/2023  Narrative: CT BRAIN - STROKE ALERT PROTOCOL INDICATION:   Stroke Alert  COMPARISON:  None  TECHNIQUE:  CT examination of the brain was performed    In addition to axial images, coronal reformatted images were created and submitted for interpretation  Radiation dose length product (DLP) for this visit:   This examination, like all CT scans performed in the [facility], was performed utilizing techniques to minimize radiation dose exposure, including the use of iterative reconstruction  and automated exposure control  IMAGE QUALITY:  Diagnostic  FINDINGS:  PARENCHYMA:  No intracranial mass, mass effect or midline shift  No CT signs of acute infarction  No acute parenchymal hemorrhage  VENTRICLES AND EXTRA-AXIAL SPACES:  Normal for the patient's age  VISUALIZED ORBITS: Normal visualized orbits  PARANASAL SINUSES: Normal visualized paranasal sinuses  CALVARIUM AND EXTRACRANIAL SOFT TISSUES:   Normal      Impression: No acute intracranial CT abnormality  Findings were directly discussed with Humberto Carrillo at 7:00 AM  Workstation performed: [id number]     CTA stroke alert (head/neck)    Result Date: 3/26/2023  Narrative: CTA NECK AND BRAIN WITH CONTRAST INDICATION: Stroke Alert COMPARISON:   None  TECHNIQUE:   Post contrast imaging was performed after administration of iodinated contrast through the neck and brain  Post contrast axial 0 625 mm images timed to opacify the arterial system  3D rendering was performed on an independent workstation  MIP reconstructions performed  Coronal reconstructions were performed of the noncontrast portion of the brain  Radiation dose length product (DLP) for this visit:  541 mGy-cm   This examination, like all CT scans performed in the [facility], was performed utilizing techniques to minimize radiation dose exposure, including the use of iterative reconstruction and automated exposure control  IV Contrast:  IMAGE QUALITY:   Diagnostic FINDINGS: CERVICAL VASCULATURE AORTIC ARCH AND GREAT VESSELS: Aortic arch and great vessel origins are unremarkable   RIGHT VERTEBRAL ARTERY CERVICAL SEGMENT:The vessel origin is unremarkable  The vessel is patent throughout the neck without high-grade stenosis  LEFT VERTEBRAL ARTERY CERVICAL SEGMENT: The vessel origin is unremarkable  The vessel is patent throughout the neck without high-grade stenosis  RIGHT EXTRACRANIAL CAROTID SEGMENT:Partially calcified atherosclerotic plaque at the bifurcation and proximal internal carotid artery  No hemodynamically significant stenosis of cervical ICA by NASCET criteria ( less than 50%) LEFT EXTRACRANIAL CAROTID SEGMENT: Partially calcified atherosclerotic plaque at the bifurcation and proximal internal carotid artery  No hemodynamically significant stenosis of cervical ICA by NASCET criteria  INTRACRANIAL VASCULATURE INTERNAL CAROTID ARTERIES: The intracranial portions of the internal carotid arteries are unremarkable  Normal ophthalmic artery origins  ANTERIOR CIRCULATION:  Normal A1 segments  Bilateral anterior cerebral arteries are unremarkable  Normal anterior comminuting artery  MIDDLE CEREBRAL ARTERY CIRCULATION:  Bilateral M1 segments and major M2 branches are patent without high-grade stenosis  DISTAL VERTEBRAL ARTERIES:  Distal vertebral arteries are patent without high-grade stenosis  Patent left PICA and right AICA/PICA origins  BASILAR ARTERY:  Basilar artery is unremarkable  Normal superior cerebellar arteries  POSTERIOR CEREBRAL ARTERIES:    Bilateral posterior cerebral arteries are patent without high-grade stenosis  Normal posterior communicating arteries  DURAL VENOUS SINUSES:  Unremarkable  NON VASCULAR ANATOMY BONY STRUCTURES: No acute or aggressive appearing osseous abnormality  Elongated bilateral ossified stylohyoid ligaments  Dental caries  SOFT TISSUES OF THE NECK:  Unremarkable  THORACIC INLET:  Unremarkable  Impression: 1  Patent major vasculature of the [place] of [place] without high-grade stenosis  No aneurysm   2   No hemodynamically significant stenosis or dissection of cervical carotid and vertebral arteries  3   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

## 2023-08-18 ENCOUNTER — HOSPITAL ENCOUNTER (EMERGENCY)
Facility: HOSPITAL | Age: 58
Discharge: HOME | End: 2023-08-18
Attending: EMERGENCY MEDICINE | Admitting: EMERGENCY MEDICINE
Payer: COMMERCIAL

## 2023-08-18 VITALS
SYSTOLIC BLOOD PRESSURE: 154 MMHG | OXYGEN SATURATION: 98 % | HEART RATE: 74 BPM | DIASTOLIC BLOOD PRESSURE: 89 MMHG | WEIGHT: 300 LBS | HEIGHT: 77 IN | RESPIRATION RATE: 18 BRPM | TEMPERATURE: 97.2 F | BODY MASS INDEX: 35.42 KG/M2

## 2023-08-18 DIAGNOSIS — K08.89 DENTALGIA: Primary | ICD-10-CM

## 2023-08-18 PROCEDURE — 99282 EMERGENCY DEPT VISIT SF MDM: CPT

## 2023-08-18 PROCEDURE — 99284 EMERGENCY DEPT VISIT MOD MDM: CPT | Performed by: EMERGENCY MEDICINE

## 2023-08-18 RX ORDER — OXYCODONE HYDROCHLORIDE 5 MG/1
5 TABLET ORAL EVERY 6 HOURS PRN
Qty: 15 TABLET | Refills: 0 | Status: SHIPPED | OUTPATIENT
Start: 2023-08-18 | End: 2023-08-18 | Stop reason: SDUPTHER

## 2023-08-18 RX ORDER — PENICILLIN V POTASSIUM 500 MG/1
500 TABLET ORAL 4 TIMES DAILY
Qty: 28 TABLET | Refills: 0 | Status: SHIPPED | OUTPATIENT
Start: 2023-08-18 | End: 2023-08-18 | Stop reason: SDUPTHER

## 2023-08-18 RX ORDER — PENICILLIN V POTASSIUM 500 MG/1
500 TABLET ORAL 4 TIMES DAILY
Qty: 28 TABLET | Refills: 0 | Status: SHIPPED | OUTPATIENT
Start: 2023-08-18 | End: 2023-08-25

## 2023-08-18 RX ORDER — OXYCODONE HYDROCHLORIDE 5 MG/1
5 TABLET ORAL EVERY 6 HOURS PRN
Qty: 15 TABLET | Refills: 0 | Status: SHIPPED | OUTPATIENT
Start: 2023-08-18

## 2023-08-18 NOTE — Clinical Note
Bradkaren Rosario was seen and treated in our emergency department on 8/18/2023. Diagnosis:     Conrad Calvillo  may return to work on return date. He may return on this date: 08/20/2023         If you have any questions or concerns, please don't hesitate to call.       Missy Reynolds, DO    ______________________________           _______________          _______________  Hospital Representative                              Date                                Time

## 2023-08-18 NOTE — DISCHARGE INSTRUCTIONS
Take medication as prescribed. Be aware that narcotics may be, sedating, constipating, and addicting. You may also use regular over-the-counter Tylenol or ibuprofen. Follow-up with dentistry as instructed. Return with any worsening.       [address] [facility]   [address] [facility] [address] [id number]   [address] [place] [facility], [address]   [phone]

## 2023-08-18 NOTE — ED PROVIDER NOTES
History  Chief Complaint   Patient presents with   • Dental Pain     Patient reports right upper dental pain x 2 days. Reports pain into face. Taking naproxen without relief. 59-year-old diabetic male presents emergency room with chief complaint of right upper dental pain ongoing for about 2 days. Radiates to his face. Taking Naprosyn without relief. No fevers or chills. No nausea or vomiting. No sore throat. No difficulty managing secretions. History provided by:  Patient  Dental Pain  Location:  Upper  Quality:  Aching  Severity:  Moderate  Onset quality:  Gradual  Timing:  Constant  Progression:  Worsening  Associated symptoms: no drooling and no fever        Prior to Admission Medications   Prescriptions Last Dose Informant Patient Reported? Taking? Diclofenac Sodium (VOLTAREN) 1 %   Yes No   Sig: Apply topically 4 (four) times a day   Patient not taking: Reported on 5/18/2023   Pediatric Multivitamins-Iron (Polyvitamin/Iron) 18 MG CHEW   Yes No   Sig: Chew 1 tablet daily   Patient not taking: Reported on 5/18/2023   amLODIPine (NORVASC) 2.5 mg tablet   No No   Sig: Take 1 tablet (2.5 mg total) by mouth daily Do not start before March 30, 2023. Patient not taking: Reported on 5/18/2023   aspirin (ECOTRIN LOW STRENGTH) 81 mg EC tablet   No No   Sig: Take 1 tablet (81 mg total) by mouth daily Do not start before March 30, 2023. atorvastatin (LIPITOR) 20 mg tablet   Yes No   Sig: Take 20 mg by mouth every morning   Patient not taking: Reported on 5/18/2023   atorvastatin (LIPITOR) 40 mg tablet   No No   Sig: Take 1 tablet (40 mg total) by mouth every evening   Patient not taking: Reported on 5/18/2023   naproxen (NAPROSYN) 500 mg tablet   Yes No   Sig: TAKE 1 TABLET BY MOUTH IN THE MORNING AND 1 TABLET IN THE EVENING. WITH FOOD.    Patient not taking: Reported on 5/18/2023   sildenafil (VIAGRA) 100 mg tablet   Yes No   Sig: Take 1 tablet daily as needed for Erectile dysfunction 1-4 hours before sexual activity   Patient not taking: Reported on 5/18/2023      Facility-Administered Medications: None       Past Medical History:   Diagnosis Date   • Diabetes mellitus ([facility])    • Stroke [facility])        Past Surgical History:   Procedure Laterality Date   • HIP SURGERY     • JOINT REPLACEMENT Left     hip       Family History   Problem Relation Age of Onset   • Diabetes Mother    • Diabetes Father      I have reviewed and agree with the history as documented. E-Cigarette/Vaping   • E-Cigarette Use Never User      E-Cigarette/Vaping Substances     Social History     Tobacco Use   • Smoking status: Every Day     Packs/day: 0.25     Types: Cigarettes   • Smokeless tobacco: Never   Vaping Use   • Vaping Use: Never used   Substance Use Topics   • Alcohol use: Never   • Drug use: Never       Review of Systems   Constitutional: Negative for fever. HENT: Positive for dental problem. Negative for drooling and sore throat. Respiratory: Negative. Cardiovascular: Negative. All other systems reviewed and are negative. Physical Exam  Physical Exam  Vitals and nursing note reviewed. Constitutional:       General: He is not in acute distress. Appearance: He is normal weight. He is not ill-appearing or toxic-appearing. HENT:      Head: Normocephalic and atraumatic. Jaw: There is normal jaw occlusion. No trismus or swelling. Right Ear: External ear normal.      Left Ear: External ear normal.      Nose: Nose normal.      Mouth/Throat:      Mouth: Mucous membranes are moist.      Dentition: Abnormal dentition. Dental caries present. No dental tenderness. Pulmonary:      Effort: Pulmonary effort is normal. No respiratory distress. Abdominal:      General: Abdomen is flat. There is no distension. Musculoskeletal:         General: No signs of injury. Skin:     Coloration: Skin is not pale. Neurological:      General: No focal deficit present. Mental Status: He is alert.    Psychiatric: Mood and Affect: Mood normal.         Thought Content: Thought content normal.         Judgment: Judgment normal.         Vital Signs  ED Triage Vitals [08/18/23 1144]   Temperature Pulse Respirations Blood Pressure SpO2   (!) 97.2 °F (36.2 °C) 74 18 154/89 98 %      Temp Source Heart Rate Source Patient Position - Orthostatic VS BP Location FiO2 (%)   Temporal Monitor Sitting Left arm --      Pain Score       10 - Worst Possible Pain           Vitals:    08/18/23 1144   BP: 154/89   Pulse: 74   Patient Position - Orthostatic VS: Sitting         Visual Acuity      ED Medications  Medications - No data to display    Diagnostic Studies  Results Reviewed     None                 No orders to display              Procedures  Procedures         ED Course                               SBIRT 22yo+    Flowsheet Row Most Recent Value   Initial Alcohol Screen: US AUDIT-C     1. How often do you have a drink containing alcohol? 0 Filed at: 08/18/2023 1146   2. How many drinks containing alcohol do you have on a typical day you are drinking? 0 Filed at: 08/18/2023 1146   3a. Male UNDER 65: How often do you have five or more drinks on one occasion? 0 Filed at: 08/18/2023 1146   3b. FEMALE Any Age, or MALE 65+: How often do you have 4 or more drinks on one occassion? 0 Filed at: 08/18/2023 1146   Audit-C Score 0 Filed at: 08/18/2023 1146   TANA: How many times in the past year have you. .. Used an illegal drug or used a prescription medication for non-medical reasons? Never Filed at: 08/18/2023 1146                    Medical Decision Making  Clinical exam with dental abscess/dental caries. Pain medications and antibiotics ordered. Follow-up with dentistry. Information provided for same. Dentalgia: self-limited or minor problem  Risk  Prescription drug management.           Disposition  Final diagnoses:   Dentalgia     Time reflects when diagnosis was documented in both MDM as applicable and the Disposition within this note     Time User Action Codes Description Comment    8/18/2023 12:17 PM Parrish Romero [L22.22] Kelsi Claire       ED Disposition     ED Disposition   Discharge    Condition   Stable    Date/Time   Fri Aug 18, 2023 12:18 PM    Comment   Mary Hare discharge to home/self care. Follow-up Information    None         Discharge Medication List as of 8/18/2023 12:20 PM      START taking these medications    Details   oxyCODONE (Roxicodone) 5 immediate release tablet Take 1 tablet (5 mg total) by mouth every 6 (six) hours as needed for severe pain for up to 15 doses Max Daily Amount: 20 mg, Starting Fri 8/18/2023, Normal      penicillin V potassium (VEETID) 500 mg tablet Take 1 tablet (500 mg total) by mouth 4 (four) times a day for 7 days, Starting Fri 8/18/2023, Until Fri 8/25/2023, Normal         CONTINUE these medications which have NOT CHANGED    Details   amLODIPine (NORVASC) 2.5 mg tablet Take 1 tablet (2.5 mg total) by mouth daily Do not start before March 30, 2023., Starting u 3/30/2023, Until Sat 4/29/2023, Normal      aspirin (ECOTRIN LOW STRENGTH) 81 mg EC tablet Take 1 tablet (81 mg total) by mouth daily Do not start before March 30, 2023., Starting u 3/30/2023, Until u 5/18/2023, Normal      atorvastatin (LIPITOR) 20 mg tablet Take 20 mg by mouth every morning, Starting Sat 3/18/2023, Historical Med      Diclofenac Sodium (VOLTAREN) 1 % Apply topically 4 (four) times a day, Historical Med      naproxen (NAPROSYN) 500 mg tablet TAKE 1 TABLET BY MOUTH IN THE MORNING AND 1 TABLET IN THE EVENING. WITH FOOD., Historical Med      Pediatric Multivitamins-Iron (Polyvitamin/Iron) 18 MG CHEW Chew 1 tablet daily, Historical Med      sildenafil (VIAGRA) 100 mg tablet Take 1 tablet daily as needed for Erectile dysfunction 1-4 hours before sexual activity, Historical Med             No discharge procedures on file.     PDMP Review       Value Time User    PDMP Reviewed  Yes 8/18/2023 12:19 PM Carmina Guajardo DO          ED Provider  Electronically Signed by           Carmina Guajardo DO  08/18/23 [id number]

## 2025-03-18 NOTE — OCCUPATIONAL THERAPY NOTE
Occupational Therapy Evaluation     Patient Name: Shayy Chapman  XJDOH'L Date: 3/27/2023  Problem List  Principal Problem:    Stroke-like symptoms  Active Problems:    Essential hypertension    Sleep apnea    Type 2 diabetes mellitus [facility])    Past Medical History  Past Medical History:   Diagnosis Date    Diabetes mellitus ([address] )     Stroke [facility])      Past Surgical History  Past Surgical History:   Procedure Laterality Date    HIP SURGERY      JOINT REPLACEMENT Left     hip           03/27/23 0909   OT Last Visit   OT Visit Date 03/27/23   Note Type   Note type Evaluation   Additional Comments Greeted seated up in chair and agreeable to skilled OT evaluation  Pain Assessment   Pain Assessment Tool 0-10   Pain Score No Pain   Restrictions/Precautions   Weight Bearing Precautions Per Order No   Home Living   Type of Home Apartment  (3rd fl)   Home Layout Multi-level;Performs ADLs on one level;Stairs to enter with rails  (6STE + 3 FOS)   Bathroom Shower/Tub Walk-in shower   Bathroom Toilet Standard   Bathroom Equipment Grab bars in shower;Grab bars around toilet   Home Equipment Walker;Cane   Prior Function   Level of [place] Independent with ADLs; Independent with IADLS; Independent with functional mobility   Lives With Significant other   Receives Help From Family   IADLs Independent with driving; Independent with meal prep; Independent with medication management   Falls in the last 6 months 0   Vocational Full time employment   Comments Prior to admission, pt lives with eric in a single level apt with 6STE and 3 FOS to 3rd fl apt  Apt has a walk in shower with GB, standard toilets with GB  Pt was I with ADLs, mobility and shares IADLs  Pt drives and works full time  Reports no falls in the past 6 months  Lifestyle   Autonomy I with ADLs, IADLs and mobility     Reciprocal Relationships Robyn Bowles   ADL   Where Assessed Chair   Eating Assistance 7  Independent   Grooming Assistance 7  Independent   UB Bathing yes Assistance 7  Independent   LB Bathing Assistance 7  [address]  7  Independent   Bed Mobility   Sit to Supine Unable to assess   Transfers   Sit to Stand 7  Independent   Stand to Sit 7  Independent   Functional Mobility   Functional Mobility 7  Independent   Additional Comments household distances no device  Balance   Static Sitting Normal   Dynamic Sitting Good   Static Standing Fair +   Dynamic Standing Fair +   Ambulatory Fair +   Activity Tolerance   Activity Tolerance Patient tolerated treatment well   Medical Staff Made Aware PTS Marlene, PT Miguel  Nurse Made Aware JOHN Saravia   RUE Assessment   RUE Assessment WFL   LUE Assessment   LUE Assessment WFL   Hand Function   Gross Motor Coordination Functional   Fine Motor Coordination Functional   Sensation   Light Touch No apparent deficits   Proprioception   Proprioception No apparent deficits   Vision-Basic Assessment   Current Vision Wears glasses only for reading   Psychosocial   Psychosocial (WDL) WDL   Cognition   Overall Cognitive Status WFL   Arousal/Participation Cooperative   Attention Within functional limits   Orientation Level Oriented X4   Memory Within functional limits   Following Commands Follows all commands and directions without difficulty   Comments pleasant   Assessment   Assessment Richelle Mate is a 58-year old male with history of hypertension, diabetes mellitus, CVA with residual right facial palsy presented intially to [facility] for right foot numbness started 6pm last night which progressed up to knee and left fingertip tingling  CT negative for any acute abnormality  CTA negative or high-grade stenosis  MRI pending  Pt with active orders for OT and up OOB as tolerated  Prior to admission, pt lives with fiancee in a single level apt with 6STE and 3 FOS to 3rd fl apt   Apt has a walk in shower with GB, standard toilets with GB  Pt was I with ADLs, mobility and shares IADLs  Pt drives and works full time  Reports no falls in the past 6 months  Upon evaluation, pt presenting at functional baseline with ADLs/ IADLs, functional transfers and functional mobility  Pt current level of function: bed mobility - independent; transfers- independent; functional mobility-independent; UB dressing / bathing - independent; LB dressing/ bathing-independent ; toileting - independent  no skilled OT warranted at this time  OT DC recommendation: home with home rehab needs  Goals   Patient Goals To go home today   Plan   OT Frequency Eval only  (DC OT)   Recommendation   OT Discharge Recommendation No rehabilitation needs   Additional Comments  The patient's raw score on the AM-PAC Daily Activity Inpatient Short Form is 24  A raw score of greater than or equal to 19 suggests the patient may benefit from discharge to home  Please refer to the recommendation of the Occupational Therapist for safe discharge planning     AM-PAC Daily Activity Inpatient   Lower Body Dressing 4   Bathing 4   Toileting 4   Upper Body Dressing 4   Grooming 4   Eating 4   Daily Activity Raw Score 24   Daily Activity Standardized Score (Calc for Raw Score >=11) 57 54   AM-PAC Applied Cognition Inpatient   Following a Speech/Presentation 4   Understanding Ordinary Conversation 4   Taking Medications 4   Remembering Where Things Are Placed or Put Away 4   Remembering List of 4-5 Errands 4   Taking Care of Complicated Tasks 4   Applied Cognition Raw Score 24   Applied Cognition Standardized Score 62 21   Kristine Ast, OT yes yes yes yes yes yes yes yes yes yes yes

## 2025-07-25 ENCOUNTER — RX ONLY (RX ONLY)
Age: 60
End: 2025-07-25

## 2025-07-25 ENCOUNTER — OPTICAL OFFICE (OUTPATIENT)
Dept: URBAN - NONMETROPOLITAN AREA CLINIC 4 | Facility: CLINIC | Age: 60
Setting detail: OPHTHALMOLOGY
End: 2025-07-25
Payer: COMMERCIAL

## 2025-07-25 ENCOUNTER — DOCTOR'S OFFICE (OUTPATIENT)
Dept: URBAN - NONMETROPOLITAN AREA CLINIC 1 | Facility: CLINIC | Age: 60
Setting detail: OPHTHALMOLOGY
End: 2025-07-25
Payer: COMMERCIAL

## 2025-07-25 DIAGNOSIS — H52.4: ICD-10-CM

## 2025-07-25 DIAGNOSIS — H52.03: ICD-10-CM

## 2025-07-25 PROBLEM — H02.401: Status: ACTIVE | Noted: 2025-07-25

## 2025-07-25 PROBLEM — E11.9 TYPE 2 DIABETES MELLITUS WITHOUT COMPLICATIONS: Status: ACTIVE | Noted: 2025-07-25

## 2025-07-25 PROBLEM — H40.003 GLAUCOMA SUSPECT; BOTH EYES: Status: ACTIVE | Noted: 2025-07-25

## 2025-07-25 PROBLEM — H25.13 CATARACT NUCLEAR SCLEROSIS; BOTH EYES: Status: ACTIVE | Noted: 2025-07-25

## 2025-07-25 PROBLEM — H21.561: Status: ACTIVE | Noted: 2025-07-25

## 2025-07-25 PROCEDURE — V2784 LENS POLYCARB OR EQUAL: HCPCS | Mod: LT

## 2025-07-25 PROCEDURE — 92015 DETERMINE REFRACTIVE STATE: CPT

## 2025-07-25 PROCEDURE — V2750 ANTI-REFLECTIVE COATING: HCPCS | Mod: LT

## 2025-07-25 PROCEDURE — V2020 VISION SVCS FRAMES PURCHASES: HCPCS

## 2025-07-25 PROCEDURE — V2784 LENS POLYCARB OR EQUAL: HCPCS

## 2025-07-25 PROCEDURE — V2750 ANTI-REFLECTIVE COATING: HCPCS

## 2025-07-25 PROCEDURE — V2203 LENS SPHCYL BIFOCAL 4.00D/.1: HCPCS

## 2025-07-25 PROCEDURE — V2203 LENS SPHCYL BIFOCAL 4.00D/.1: HCPCS | Mod: LT

## 2025-07-25 PROCEDURE — S0620 ROUTINE OPHTHALMOLOGICAL EXA: HCPCS

## 2025-07-25 ASSESSMENT — REFRACTION_MANIFEST
OS_ADD: +2.25
OS_VA1: 20/20
OS_SPHERE: +2.75
OD_CYLINDER: -1.25
OD_VA2: 20/20
OS_VA2: 2020/
OU_VA: 20/20
OD_SPHERE: PLANO
OD_VA1: 20/30
OS_CYLINDER: -0.50
OS_AXIS: 150
OD_ADD: +2.25
OD_AXIS: 040

## 2025-07-25 ASSESSMENT — REFRACTION_CURRENTRX
OD_OVR_VA: 20/
OS_SPHERE: +3.00
OD_AXIS: 036
OD_CYLINDER: -1.00
OD_VPRISM_DIRECTION: SV
OS_VPRISM_DIRECTION: SV
OS_CYLINDER: -1.00
OS_AXIS: 159
OS_OVR_VA: 20/
OD_SPHERE: +0.25

## 2025-07-25 ASSESSMENT — CONFRONTATIONAL VISUAL FIELD TEST (CVF)
OS_FINDINGS: FULL
OD_FINDINGS: FULL

## 2025-07-25 ASSESSMENT — REFRACTION_AUTOREFRACTION
OD_SPHERE: +0.50
OD_AXIS: 005
OD_CYLINDER: -1.50
OS_CYLINDER: -0.50
OS_AXIS: 163
OS_SPHERE: +3.50

## 2025-07-25 ASSESSMENT — TONOMETRY
OS_IOP_MMHG: 14
OD_IOP_MMHG: 16

## 2025-07-25 ASSESSMENT — VISUAL ACUITY
OD_BCVA: 20/20
OS_BCVA: 20/40

## 2025-07-25 ASSESSMENT — LID POSITION - PTOSIS: OD_PTOSIS: RUL 4+
